# Patient Record
Sex: FEMALE | Race: WHITE | NOT HISPANIC OR LATINO | Employment: OTHER | URBAN - METROPOLITAN AREA
[De-identification: names, ages, dates, MRNs, and addresses within clinical notes are randomized per-mention and may not be internally consistent; named-entity substitution may affect disease eponyms.]

---

## 2017-04-26 ENCOUNTER — TELEPHONE (OUTPATIENT)
Dept: MEDICAL GROUP | Facility: PHYSICIAN GROUP | Age: 72
End: 2017-04-26

## 2017-05-15 ENCOUNTER — PATIENT OUTREACH (OUTPATIENT)
Dept: HEALTH INFORMATION MANAGEMENT | Facility: OTHER | Age: 72
End: 2017-05-15

## 2017-05-15 NOTE — PROGRESS NOTES
5/15/17  -     WebIZ Checked & Epic Updated: No records found  HealthConnect Verified: yes  Verify PCP: yes    Communication Preference Obtained: yes     Review Care Team: yes    Annual Wellness Visit Scheduling  1. Scheduling Status:Scheduled        Care Gap Scheduling (Attempt to Schedule EACH Overdue Care Gap!)     Health Maintenance Due   Topic Date Due   • IMM DTaP/Tdap/Td Vaccine (1 - Tdap) Pt will discuss care gaps with PCP   • IMM ZOSTER VACCINE     • IMM PNEUMOCOCCAL 65+ (ADULT) LOW/MEDIUM RISK SERIES (1 of 2 - PCV13)    • A1C SCREENING     • DIABETES MONOFILAMENT / LE EXAM     • FASTING LIPID PROFILE     • URINE ACR / MICROALBUMIN     • SERUM CREATININE           First Look [employer] Activation: already active  First Look [employer] Prosper: yes  Virtual Visits: yes  Opt In to Text Messages: yes

## 2017-06-05 ENCOUNTER — TELEPHONE (OUTPATIENT)
Dept: MEDICAL GROUP | Facility: PHYSICIAN GROUP | Age: 72
End: 2017-06-05

## 2017-06-05 NOTE — TELEPHONE ENCOUNTER
Future Appointments       Provider Department Center    6/12/2017 9:00 AM [facility]O.; [facility]          Left message for patient to call back regarding pre-visit planning. Please transfer call to [phone].

## 2017-06-08 NOTE — TELEPHONE ENCOUNTER
ANNUAL WELLNESS VISIT PRE-VISIT PLANNING     1.  Reviewed note from last office visit with PCP: YES    2.  If any orders were placed at last visit, do we have Results/Consult Notes?        •  Labs - Labs ordered but needs more so she will wait for new order at appt.       •  Imaging - Imaging was not ordered at last office visit.       •  Referrals - No referrals were ordered at last office visit.    3.  Immunizations were updated in [facility] using WebIZ?: Yes       •  WebIZ Recommendations: PREVNAR (PCV13) , TDAP and ZOSTAVAX (Shingles)       •  Is patient due for Tdap? YES. Patient was notified of copay.       •  Is patient due for Shingles? YES. Patient was notified of copay.     4.  Patient is due for the following Health Maintenance Topics:   Health Maintenance Due   Topic Date Due   • IMM DTaP/Tdap/Td Vaccine (1 - Tdap) 05[birth year]09/1963   • IMM ZOSTER VACCINE  05/09/2004   • IMM PNEUMOCOCCAL 65+ (ADULT) LOW/MEDIUM RISK SERIES (1 of 2 - PCV13) 05/09/2009   • A1C SCREENING  11/13/2016   • DIABETES MONOFILAMENT / LE EXAM  11/24/2016   • FASTING LIPID PROFILE  05/13/2017   • URINE ACR / MICROALBUMIN  05/13/2017   • SERUM CREATININE  05/13/2017   • Annual Wellness Visit  05/19/2017       5.  Reviewed/Updated the following with patient:       •   Preferred Pharmacy? YES       •   Preferred Lab? YES       •   Medications? YES. Was Abstract Encounter opened and chart updated? YES       •   Social History? YES. Was Abstract Encounter opened and chart updated? YES       •   Family History? YES. Was Abstract Encounter opened and chart updated? YES    6.  Care Team Updated:       •   DME Company (gait device, O2, CPAP, etc.): N\A       •   Other Specialists (eye doctor, derm, GYN, cardiology, endo, etc): YES    7.  Patient has the following Care Path diagnoses on Problem List:  DIABETES    Has patient ever had diabetes education? Yes, and is NOT interested in more at this time.      8.  Specialty Comments was updated with diagnosis  information provided by SCP: YES    9.  Patient was advised: “This is a free wellness visit. The provider will screen for medical conditions to help you stay healthy. If you have other concerns to address you may be asked to discuss these at a separate visit or there may be an additional fee.”     6.  Patient was informed to arrive 15 min prior to their scheduled appointment and bring in their medication bottles.

## 2017-06-12 ENCOUNTER — OFFICE VISIT (OUTPATIENT)
Dept: MEDICAL GROUP | Facility: PHYSICIAN GROUP | Age: 72
End: 2017-06-12
Payer: MEDICARE

## 2017-06-12 VITALS
HEART RATE: 88 BPM | BODY MASS INDEX: 21 KG/M2 | HEIGHT: 64 IN | SYSTOLIC BLOOD PRESSURE: 102 MMHG | TEMPERATURE: 97.9 F | OXYGEN SATURATION: 95 % | DIASTOLIC BLOOD PRESSURE: 52 MMHG | WEIGHT: 123 LBS

## 2017-06-12 DIAGNOSIS — E78.00 HYPERCHOLESTEREMIA: ICD-10-CM

## 2017-06-12 DIAGNOSIS — E11.9 TYPE 2 DIABETES MELLITUS WITHOUT COMPLICATION, WITHOUT LONG-TERM CURRENT USE OF INSULIN (HCC): ICD-10-CM

## 2017-06-12 DIAGNOSIS — Z00.00 MEDICARE ANNUAL WELLNESS VISIT, SUBSEQUENT: ICD-10-CM

## 2017-06-12 DIAGNOSIS — M81.0 AGE-RELATED OSTEOPOROSIS WITHOUT CURRENT PATHOLOGICAL FRACTURE: ICD-10-CM

## 2017-06-12 PROCEDURE — G0439 PPPS, SUBSEQ VISIT: HCPCS | Performed by: FAMILY MEDICINE

## 2017-06-12 ASSESSMENT — PAIN SCALES - GENERAL: PAINLEVEL: NO PAIN

## 2017-06-12 ASSESSMENT — PATIENT HEALTH QUESTIONNAIRE - PHQ9: CLINICAL INTERPRETATION OF PHQ2 SCORE: 0

## 2017-06-12 NOTE — MR AVS SNAPSHOT
"        Alejandraradha Martinez   2017 9:20 AM   Office Visit   MRN: [id number]    Department:  [facility]   Dept Phone:  [phone]    Description:  Female : 1945   Provider:  Helena Mccormick D.O.; [facility]            Reason for Visit     Annual Wellness Visit           Allergies as of 2017     No Known Allergies      You were diagnosed with     Medicare annual wellness visit, subsequent   [049213]       Type 2 diabetes mellitus without complication, without long-term current use of insulin (CMS-HCC)   [4881956]       Hypercholesteremia   [730900]       Age-related osteoporosis without current pathological fracture   [484716]         Vital Signs     Blood Pressure Pulse Temperature Height Weight Body Mass Index    102/52 mmHg 88 36.6 °C (97.9 °F) 1.632 m (5' 4.25\") 55.792 kg (123 lb) 20.95 kg/m2    Oxygen Saturation Smoking Status                95% Never Smoker           Basic Information     Date Of Birth Sex Race Ethnicity Preferred Language    1945 Female White Non- English      Problem List              ICD-10-CM Priority Class Noted - Resolved    Hypercholesteremia E78.00   2014 - Present    Type 2 diabetes mellitus without complication (CMS-HCC) E11.9 High  2014 - Present      Health Maintenance        Date Due Completion Dates    IMM DTaP/Tdap/Td Vaccine (1 - Tdap) 1963 ---    IMM ZOSTER VACCINE 2004 ---    IMM PNEUMOCOCCAL 65+ (ADULT) LOW/MEDIUM RISK SERIES (1 of 2 - PCV13) 2009 ---    A1C SCREENING 2016, 2015, 2015, 8/15/2014, 1/3/2014, 10/14/2013, 2013, 2012    DIABETES MONOFILAMENT / LE EXAM 2016    FASTING LIPID PROFILE 2017, 2015, 8/15/2014, 10/14/2013, 2013, 2012, 2012, 2011    URINE ACR / MICROALBUMIN 2017, 2015, 8/15/2014, 1/3/2014, 10/14/2013, 2013, 2012    SERUM CREATININE 2017, 2015, " 9/12/2012, 8/30/2011    RETINAL SCREENING 6/24/2017 6/24/2016, 1/8/2013    MAMMOGRAM 7/19/2017 7/19/2016, 11/6/2014 (Postponed), 2/21/2013, 2/3/2011, 9/9/2009, 9/9/2009, 5/1/2007, 5/1/2007, 5/1/2007    Override on 11/6/2014: Postponed (exam ordered)    BONE DENSITY 11/6/2019 11/6/2014 (Postponed)    Override on 11/6/2014: Postponed (exam ordered)    COLONOSCOPY 11/6/2024 11/6/2014 (Done)    Override on 11/6/2014: Done            Current Immunizations     No immunizations on file.      Below and/or attached are the medications your provider expects you to take. Review all of your home medications and newly ordered medications with your provider and/or pharmacist. Follow medication instructions as directed by your provider and/or pharmacist. Please keep your medication list with you and share with your provider. Update the information when medications are discontinued, doses are changed, or new medications (including over-the-counter products) are added; and carry medication information at all times in the event of emergency situations     Allergies:  No Known Allergies          Medications  Valid as of: June 12, 2017 - 10:04 AM    Generic Name Brand Name Tablet Size Instructions for use    Fluocinonide (Cream) LIDEX 0.05 % Apply bid to affected area as needed        Lisinopril (Tab) PRINIVIL 2.5 MG Take 1 Tab by mouth every day. TAKE 1 TAB BY MOUTH EVERY DAY.        MetFORMIN HCl (Tab) GLUCOPHAGE 500 MG Take 1 Tab by mouth 2 times a day. TAKE 1 TABLET BY MOUTH TWICE A DAY        Pravastatin Sodium (Tab) PRAVACHOL 20 MG Take 1 Tab by mouth every day.        .                 Medicines prescribed today were sent to:     [id number] - [place] - [address]    [address] [place] [address]    Phone: [phone] Fax: [phone]    Open 24 Hours?: No      Medication refill instructions:       If your prescription bottle indicates you have medication refills left, it is not necessary to call your provider’s  office. Please contact your pharmacy and they will refill your medication.    If your prescription bottle indicates you do not have any refills left, you may request refills at any time through one of the following ways: The online [facility] system (except Urgent Care), by calling your provider’s office, or by asking your pharmacy to contact your provider’s office with a refill request. Medication refills are processed only during regular business hours and may not be available until the next business day. Your provider may request additional information or to have a follow-up visit with you prior to refilling your medication.   *Please Note: Medication refills are assigned a new Rx number when refilled electronically. Your pharmacy may indicate that no refills were authorized even though a new prescription for the same medication is available at the pharmacy. Please request the medicine by name with the pharmacy before contacting your provider for a refill.        Your To Do List     Future Labs/Procedures Complete By Expires    CBC WITH DIFFERENTIAL  As directed 6/12/2018    COMP METABOLIC PANEL  As directed 6/12/2018    HEMOGLOBIN A1C  As directed 6/12/2018    LIPID PROFILE  As directed 6/12/2018    MICROALBUMIN CREAT RATIO URINE  As directed 6/12/2018      Other Notes About Your Plan     The 10-year ASCVD risk score (Ro VLADIMIR Jr., et al., 2013) is: 17.3%    Values used to calculate the score:      Age: 71 years      Sex: Female      Is an : No      Diabetic: Yes      Tobacco smoker: No      Systolic Blood Pressure: 118 mmHg      Prescribed Antihypertensives: No      HDL Cholesterol: 71 mg/dL      Total Cholesterol: 156 mg/dL   Goal LDL <100           [employer] Access Code: Activation code not generated  Current [facility] Status: Active

## 2017-06-12 NOTE — PROGRESS NOTES
Chief Complaint   Patient presents with   • Annual Wellness Visit         HPI:  Alejandra Martinez is a 72 y.o. female here for Medicare Annual Wellness Visit        Patient Active Problem List    Diagnosis Date Noted   • Type 2 diabetes mellitus without complication (CMS-[facility]) 11/06/2014     Priority: High   • Hypercholesteremia 11/06/2014       Current Outpatient Prescriptions   Medication Sig Dispense Refill   • pravastatin (PRAVACHOL) 20 MG Tab Take 1 Tab by mouth every day. 90 Tab 4   • lisinopril (PRINIVIL) 2.5 MG Tab Take 1 Tab by mouth every day. TAKE 1 TAB BY MOUTH EVERY DAY. 90 Tab 4   • metformin (GLUCOPHAGE) 500 MG Tab Take 1 Tab by mouth 2 times a day. TAKE 1 TABLET BY MOUTH TWICE A  Tab 4   • fluocinonide (LIDEX) 0.05 % CREA Apply bid to affected area as needed 60 g 1     No current facility-administered medications for this visit.        Patient is taking medications as noted in medication list.  Current supplements as per medication list.   Chronic narcotic pain medicines: no    Allergies: Review of patient's allergies indicates no known allergies.    Current social contact/activities: bowling weekly / visits with family / [employer] functions     Is patient current with immunizations?  No, due for PREVNAR (PCV13) , TDAP and ZOSTAVAX (Shingles). Patient is interested in receiving NONE today.     She  reports that she has never smoked. She has never used smokeless tobacco. She reports that she does not drink alcohol or use illicit drugs.  Counseling given: Yes        DPA/Advanced Directive:  Patient does not have an Advanced Directive.  A packet and workshop information was given on Advanced Directives.    ROS:    Gait: Uses no assistive device   Ostomy: no   Other tubes: no   Amputations: no   Chronic oxygen use: no   Last eye exam: December 2016   Wears hearing aids: no   : Denies incontinence.       Screening:    DIABETES  1. Records requested for overdue HM topics specifically for diabetes?  no    2. Has patient ever had diabetes education? Yes      a. If so, when? When diagnosed      b. If not, is patient interested? no      Depression Screening    Little interest or pleasure in doing things?  0 - not at all  Feeling down, depressed, or hopeless?  0 - not at all  Patient Health Questionnaire Score: 0  If depressive symptoms identified deferred to follow up visit unless specifically addressed in assessment and plan.    Interpretation of PHQ-9 Total Score   Score Severity   1-4 Minimal Depression   5-9 Mild Depression   10-14 Moderate Depression   15-19 Moderately Severe Depression   20-27 Severe Depression    Screening for Cognitive Impairment    Three Minute Recall (banana, sunrise, fence)  3/3    Draws clock face with all 12 numbers and sets the hands to show 10 minutes past 10 o'clock  1 5/5  If cognitive concerns identified deferred to follow up visit unless specifically addressed in assessment and plan.    Fall Risk Assessment    Has the patient had two or more falls in the last year or any fall with injury in the last year?  No  If Fall Risk identified deferred to follow up visit unless specifically addressed in assessment and plan.    Safety Assessment    Throw rugs on floor.  No  Handrails on all stairs.  Yes  Good lighting in all hallways.  Yes  Difficulty hearing.  No  Patient counseled about all safety risks that were identified.    Functional Assessment ADLs    Are there any barriers preventing you from cooking for yourself or meeting nutritional needs?  No.    Are there any barriers preventing you from driving safely or obtaining transportation?  No.    Are there any barriers preventing you from using a telephone or calling for help?  No.    Are there any barriers preventing you from shopping?  No.    Are there any barriers preventing you from taking care of your own finances?  No.    Are there any barriers preventing you from managing your medications?  No.    Are currently engaging any  exercise or physical activity?  Yes.  Tries to walk every day for about 15-30 mins    Health Maintenance Summary                IMM DTaP/Tdap/Td Vaccine Overdue 5/9/1963     IMM ZOSTER VACCINE Overdue 5/9/2004     IMM PNEUMOCOCCAL 65+ (ADULT) LOW/MEDIUM RISK SERIES Overdue 5/9/2009     A1C SCREENING Overdue 11/13/2016      Done 5/13/2016 HEMOGLOBIN A1C (A)     Patient has more history with this topic...    DIABETES MONOFILAMENT / LE EXAM Overdue 11/24/2016      Done 11/24/2015 AMB DIABETIC MONOFILAMENT LOWER EXTREMITY EXAM    FASTING LIPID PROFILE Overdue 5/13/2017      Done 5/13/2016 LIPID PROFILE     Patient has more history with this topic...    URINE ACR / MICROALBUMIN Overdue 5/13/2017      Done 5/13/2016 MICROALBUMIN CREAT RATIO URINE     Patient has more history with this topic...    SERUM CREATININE Overdue 5/13/2017      Done 5/13/2016 COMP METABOLIC PANEL     Patient has more history with this topic...    Annual Wellness Visit Overdue 5/19/2017      Done 5/18/2016 Visit Dx: Medicare annual wellness visit, initial    RETINAL SCREENING Next Due 6/24/2017      Done 6/24/2016 REFERRAL FOR RETINAL SCREENING EXAM     Patient has more history with this topic...    MAMMOGRAM Next Due 7/19/2017      Done 7/19/2016 MA-SCREEN MAMMO W/CAD-BILAT     Patient has more history with this topic...    BONE DENSITY Next Due 11/6/2019      Postponed 11/6/2014 exam ordered    COLONOSCOPY Next Due 11/6/2024      Done 11/6/2014           Patient Care Team:  Helena Mccormick D.O. as PCP - General (Family Medicine)  Rodrigue Enriquez M.D. as Consulting Physician (Ophthalmology)    Social History   Substance Use Topics   • Smoking status: Never Smoker    • Smokeless tobacco: Never Used      Comment: avoid all tobacco products   • Alcohol Use: No      Comment: little to none     Family History   Problem Relation Age of Onset   • Cancer Mother      breast   • Hyperlipidemia Mother    • Cancer Father      bladder   • Genetic Father       "Parkinson's   • Diabetes Neg Hx    • Stroke Neg Hx    • Lung Disease Neg Hx    • Heart Disease Neg Hx    • Hypertension Neg Hx    • Cancer Paternal Aunt      breast   • No Known Problems Sister    • No Known Problems Brother      She  has a past medical history of Hypercholesteremia (11/6/2014) and Type II or unspecified type diabetes mellitus without mention of complication, not stated as uncontrolled (11/6/2014).   Past Surgical History   Procedure Laterality Date   • Appendectomy             Exam:     Blood pressure 102/52, pulse 88, temperature 36.6 °C (97.9 °F), height 1.632 m (5' 4.25\"), weight 55.792 kg (123 lb), SpO2 95 %. Body mass index is 20.95 kg/(m^2).    Hearing excellent.    Dentition good  Alert, oriented in no acute distress.  Eye contact is good, speech goal directed, affect calm  Diabetic Foot Exam:  Proprioception intact. Monofilament testing with a 10 gram force: sensation intact: intact bilaterally throughout the ball of the foot, great toe and heel.  Visual Inspection: Feet without maceration, ulcers, lesion, fissures.  Pedal pulses: intact bilaterally      Assessment and Plan. The following treatment and monitoring plan is recommended:    1. Medicare annual wellness visit, subsequent      2. Type 2 diabetes mellitus without complication, without long-term current use of insulin (CMS-[facility])  Well controlled in the past.  Will recheck labs.  Discussed diet, exercise, and disease management.  Pt also understands how to count carbs with a goal for 50 carbs per meal. The pt was advised to set a goal for each meal to be 25%carbs, 25% protein, and 50% fruits/veggies. Pt also advised to look at ADA website for further information.  Pt is currently on Metformin, ASA, ACEi, and statin.   - MICROALBUMIN CREAT RATIO URINE; Future  - HEMOGLOBIN A1C; Future  - LIPID PROFILE; Future  - COMP METABOLIC PANEL; Future  - CBC WITH DIFFERENTIAL; Future  - Diabetic Monofilament Lower Extremity Exam    3. " Hypercholesteremia  Continue Pravastatin    4. Age-related osteoporosis without current pathological fracture  From lifeline screening in 2016. Recommend Vit D and Calcium.       Services suggested: No services needed at this time  Health Care Screening recommendations as per orders if indicated.  Referrals offered: PT/OT/Nutrition counseling/Behavioral Health/Smoking cessation as per orders if indicated.    Discussion today about general wellness and lifestyle habits:    · Prevent falls and reduce trip hazards; Cautioned about securing or removing rugs.  · Have a working fire alarm and carbon monoxide detector;   · Engage in regular physical activity and social activities       Follow-up: Return in about 6 months (around 12/12/2017) for F/U DM.

## 2017-06-14 NOTE — TELEPHONE ENCOUNTER
Was the patient seen in the last year in this department? Yes     Does patient have an active prescription for medications requested? No     Received Request Via: Pharmacy      Pt met protocol?: Yes    A1C 5/16

## 2017-06-15 NOTE — TELEPHONE ENCOUNTER
Patient has recently been seen by PCP within the last 6 months per protocol (5/17). Will refill medications for 3 months as pt still needs labs.  Lab Results   Component Value Date/Time    GLYCOHEMOGLOBIN 6.1* 05/13/2016 06:36 AM      Lab Results   Component Value Date/Time    MICRO ALB CREAT RATIO see below 05/13/2016 06:36 AM    MICROALBUMIN, URINE RANDOM <0.7 05/13/2016 06:36 AM      Lab Results   Component Value Date/Time    ALKALINE PHOSPHATASE 60 05/13/2016 06:36 AM    AST(SGOT) 13 05/13/2016 06:36 AM    ALT(SGPT) 13 05/13/2016 06:36 AM    TOTAL BILIRUBIN 0.5 05/13/2016 06:36 AM

## 2017-07-14 RX ORDER — LISINOPRIL 2.5 MG/1
TABLET ORAL
Qty: 90 TAB | Refills: 2 | Status: SHIPPED | OUTPATIENT
Start: 2017-07-14 | End: 2018-04-02 | Stop reason: SDUPTHER

## 2017-07-27 ENCOUNTER — HOSPITAL ENCOUNTER (OUTPATIENT)
Dept: LAB | Facility: MEDICAL CENTER | Age: 72
End: 2017-07-27
Attending: FAMILY MEDICINE
Payer: MEDICARE

## 2017-07-27 DIAGNOSIS — E11.9 TYPE 2 DIABETES MELLITUS WITHOUT COMPLICATION, WITHOUT LONG-TERM CURRENT USE OF INSULIN (HCC): ICD-10-CM

## 2017-07-27 LAB
ALBUMIN SERPL BCP-MCNC: 4 G/DL (ref 3.2–4.9)
ALBUMIN/GLOB SERPL: 1.6 G/DL
ALP SERPL-CCNC: 57 U/L (ref 30–99)
ALT SERPL-CCNC: 11 U/L (ref 2–50)
ANION GAP SERPL CALC-SCNC: 4 MMOL/L (ref 0–11.9)
AST SERPL-CCNC: 15 U/L (ref 12–45)
BASOPHILS # BLD AUTO: 0.9 % (ref 0–1.8)
BASOPHILS # BLD: 0.06 K/UL (ref 0–0.12)
BILIRUB SERPL-MCNC: 0.5 MG/DL (ref 0.1–1.5)
BUN SERPL-MCNC: 13 MG/DL (ref 8–22)
CALCIUM SERPL-MCNC: 8.8 MG/DL (ref 8.5–10.5)
CHLORIDE SERPL-SCNC: 104 MMOL/L (ref 96–112)
CHOLEST SERPL-MCNC: 144 MG/DL (ref 100–199)
CO2 SERPL-SCNC: 30 MMOL/L (ref 20–33)
CREAT SERPL-MCNC: 0.69 MG/DL (ref 0.5–1.4)
CREAT UR-MCNC: 45 MG/DL
EOSINOPHIL # BLD AUTO: 0.19 K/UL (ref 0–0.51)
EOSINOPHIL NFR BLD: 3 % (ref 0–6.9)
ERYTHROCYTE [DISTWIDTH] IN BLOOD BY AUTOMATED COUNT: 45.6 FL (ref 35.9–50)
EST. AVERAGE GLUCOSE BLD GHB EST-MCNC: 128 MG/DL
GFR SERPL CREATININE-BSD FRML MDRD: >60 ML/MIN/1.73 M 2
GLOBULIN SER CALC-MCNC: 2.5 G/DL (ref 1.9–3.5)
GLUCOSE SERPL-MCNC: 87 MG/DL (ref 65–99)
HBA1C MFR BLD: 6.1 % (ref 0–5.6)
HCT VFR BLD AUTO: 44.2 % (ref 37–47)
HDLC SERPL-MCNC: 69 MG/DL
HGB BLD-MCNC: 14.3 G/DL (ref 12–16)
IMM GRANULOCYTES # BLD AUTO: 0.01 K/UL (ref 0–0.11)
IMM GRANULOCYTES NFR BLD AUTO: 0.2 % (ref 0–0.9)
LDLC SERPL CALC-MCNC: 60 MG/DL
LYMPHOCYTES # BLD AUTO: 2.11 K/UL (ref 1–4.8)
LYMPHOCYTES NFR BLD: 33.1 % (ref 22–41)
MCH RBC QN AUTO: 29.8 PG (ref 27–33)
MCHC RBC AUTO-ENTMCNC: 32.4 G/DL (ref 33.6–35)
MCV RBC AUTO: 92.1 FL (ref 81.4–97.8)
MICROALBUMIN UR-MCNC: <0.7 MG/DL
MICROALBUMIN/CREAT UR: NORMAL MG/G (ref 0–30)
MONOCYTES # BLD AUTO: 0.67 K/UL (ref 0–0.85)
MONOCYTES NFR BLD AUTO: 10.5 % (ref 0–13.4)
NEUTROPHILS # BLD AUTO: 3.33 K/UL (ref 2–7.15)
NEUTROPHILS NFR BLD: 52.3 % (ref 44–72)
NRBC # BLD AUTO: 0 K/UL
NRBC BLD AUTO-RTO: 0 /100 WBC
PLATELET # BLD AUTO: 368 K/UL (ref 164–446)
PMV BLD AUTO: 9.8 FL (ref 9–12.9)
POTASSIUM SERPL-SCNC: 3.9 MMOL/L (ref 3.6–5.5)
PROT SERPL-MCNC: 6.5 G/DL (ref 6–8.2)
RBC # BLD AUTO: 4.8 M/UL (ref 4.2–5.4)
SODIUM SERPL-SCNC: 138 MMOL/L (ref 135–145)
TRIGL SERPL-MCNC: 76 MG/DL (ref 0–149)
WBC # BLD AUTO: 6.4 K/UL (ref 4.8–10.8)

## 2017-07-27 PROCEDURE — 80053 COMPREHEN METABOLIC PANEL: CPT

## 2017-07-27 PROCEDURE — 80061 LIPID PANEL: CPT

## 2017-07-27 PROCEDURE — 85025 COMPLETE CBC W/AUTO DIFF WBC: CPT

## 2017-07-27 PROCEDURE — 36415 COLL VENOUS BLD VENIPUNCTURE: CPT

## 2017-07-27 PROCEDURE — 82043 UR ALBUMIN QUANTITATIVE: CPT

## 2017-07-27 PROCEDURE — 82570 ASSAY OF URINE CREATININE: CPT

## 2017-07-27 PROCEDURE — 83036 HEMOGLOBIN GLYCOSYLATED A1C: CPT

## 2017-11-03 DIAGNOSIS — E78.00 HYPERCHOLESTEREMIA: ICD-10-CM

## 2017-11-03 RX ORDER — PRAVASTATIN SODIUM 20 MG
20 TABLET ORAL DAILY
Qty: 90 TAB | Refills: 4 | Status: SHIPPED | OUTPATIENT
Start: 2017-11-03 | End: 2019-02-22 | Stop reason: SDUPTHER

## 2017-12-27 NOTE — TELEPHONE ENCOUNTER
Was the patient seen in the last year in this department? Yes     Does patient have an active prescription for medications requested? No     Received Request Via: Patient      Pt met protocol?: Yes, OV 6/17   Lab Results   Component Value Date/Time    HBA1C 6.1 (H) 07/27/2017 06:28 AM

## 2018-01-15 ENCOUNTER — OFFICE VISIT (OUTPATIENT)
Dept: MEDICAL GROUP | Facility: PHYSICIAN GROUP | Age: 73
End: 2018-01-15
Payer: MEDICARE

## 2018-01-15 VITALS
HEIGHT: 64 IN | SYSTOLIC BLOOD PRESSURE: 122 MMHG | BODY MASS INDEX: 21.17 KG/M2 | DIASTOLIC BLOOD PRESSURE: 60 MMHG | WEIGHT: 124 LBS | HEART RATE: 93 BPM | TEMPERATURE: 98.5 F | OXYGEN SATURATION: 95 %

## 2018-01-15 DIAGNOSIS — L60.9 NAIL ABNORMALITIES: ICD-10-CM

## 2018-01-15 DIAGNOSIS — K92.1 HEMATOCHEZIA: ICD-10-CM

## 2018-01-15 DIAGNOSIS — Z12.31 ENCOUNTER FOR SCREENING MAMMOGRAM FOR MALIGNANT NEOPLASM OF BREAST: ICD-10-CM

## 2018-01-15 DIAGNOSIS — E11.9 TYPE 2 DIABETES MELLITUS WITHOUT COMPLICATION, WITHOUT LONG-TERM CURRENT USE OF INSULIN (HCC): ICD-10-CM

## 2018-01-15 DIAGNOSIS — N81.10 BLADDER PROLAPSE, FEMALE, ACQUIRED: ICD-10-CM

## 2018-01-15 PROCEDURE — 99214 OFFICE O/P EST MOD 30 MIN: CPT | Performed by: FAMILY MEDICINE

## 2018-01-16 ENCOUNTER — HOSPITAL ENCOUNTER (OUTPATIENT)
Facility: MEDICAL CENTER | Age: 73
End: 2018-01-16
Attending: FAMILY MEDICINE
Payer: MEDICARE

## 2018-01-16 PROCEDURE — 82274 ASSAY TEST FOR BLOOD FECAL: CPT

## 2018-01-16 NOTE — PROGRESS NOTES
Subjective:     Chief Complaint   Patient presents with   • Other     blood in stool on Dec 9th after getting sick, no longer occuring       Alejandra Martinez is a 72 y.o. female here today for blood in stool.  Pt reports she was at friend's house on Dec 9th.  Pt had diarrhea and vomiting for several hours.  Pt then had blood clot pass followed by a few small clots. Pt had fatigue as well.  Since Dec 10th pt has had no GI issues or fatigue. No blood in stool or dark stool.     Diabetes:  Patient is compliant with medications. No side effects reported such as diarrhea, abdominal pain, dark tarry stool, hematochezia, headache, shakiness, or lightheadedness due to low blood sugar.  Patient denies chest pain, numbness and tingling in hands and feet, change in sensation in hands or feet, sores on feet, change in urine,  or change in vision.      Pt reports bladder prolapse.  Pt is interested in non-surgical options.      No Known Allergies  Current medicines (including changes today)  Current Outpatient Prescriptions   Medication Sig Dispense Refill   • metformin (GLUCOPHAGE) 500 MG Tab TAKE 1 TABLET BY MOUTH TWICE A  Tab 0   • pravastatin (PRAVACHOL) 20 MG Tab Take 1 Tab by mouth every day. 90 Tab 4   • lisinopril (PRINIVIL) 2.5 MG Tab TAKE 1 TABLET BY MOUTH ONCE DAILY 90 Tab 2   • fluocinonide (LIDEX) 0.05 % CREA Apply bid to affected area as needed 60 g 1     No current facility-administered medications for this visit.      Social History   Substance Use Topics   • Smoking status: Never Smoker   • Smokeless tobacco: Never Used      Comment: avoid all tobacco products   • Alcohol use No      Comment: little to none     Family Status   Relation Status   • Mother  at age 80's   • Father  at age 80's   • Sister Alive   • Brother Alive   • Paternal Aunt    • Neg Hx      Family History   Problem Relation Age of Onset   • Cancer Mother      breast   • Hyperlipidemia Mother    • Cancer Father      bladder  "  • Genetic Father      Parkinson's   • No Known Problems Sister    • No Known Problems Brother    • Cancer Paternal Aunt      breast   • Diabetes Neg Hx    • Stroke Neg Hx    • Lung Disease Neg Hx    • Heart Disease Neg Hx    • Hypertension Neg Hx      She    has a past medical history of Hypercholesteremia (11/6/2014) and Type II or unspecified type diabetes mellitus without mention of complication, not stated as uncontrolled (11/6/2014).        ROS   GEN: no weight loss, fevers, or chills  Resp: no shortness of breath, no cough  CV: no racing heart, no irregular beats, no chest pain  Abd: no nausea, no vomiting, no diarrhea, no constipation, no blood in stool, no dark stools, no incontinence  : no dysuria, no hematuria, no urinary incontinence, no increased frequency  MSK: no muscle aches, no joint pain, no limited motion  Neuro: no headaches, no dizziness, no LOC, no weakness, no numbness/tingling       Objective:     Blood pressure 122/60, pulse 93, temperature 36.9 °C (98.5 °F), height 1.632 m (5' 4.25\"), weight 56.2 kg (124 lb), SpO2 95 %. Body mass index is 21.12 kg/m².   Physical Exam:  Constitutional: Alert, no distress.  Skin: Warm, dry, good turgor, ridges and brittle nails  Eye: Equal, round and reactive, conjunctiva clear, lids normal.  ENMT: Lips without lesions, oropharynx non-erythematous, no exudate, moist oral mucosa  Neck: Trachea midline, no masses, no thyromegaly. No cervical or supraclavicular lymphadenopathy. Full ROM  Respiratory: Unlabored respiratory effort, good air movement, lungs clear to auscultation, no wheezes, no ronchi.  Cardiovascular:RRR, +S1, S2, no murmur, no peripheral edema, pedal and radial pulses equal and intact bilat  Abdomen: Soft, non-tender, no masses, no hepatosplenomegaly, no rebound tenderness, no gaurding, no suprapubic tenderness.  MSK:5/5 muscle strength in upper extremities as well as lower extremity bilaterally  Psych: Alert and oriented, appropriate affect " and mood.  Neuro: CN2-12 intact, no gross motor or sensory deficits      Assessment and Plan:   The following treatment plan was discussed    1. Hematochezia  New complaint: now resolved.  Will obtain FIT and CBC.  Likey due to acute illnees  - CBC WITH DIFFERENTIAL; Futurel  - COMP METABOLIC PANEL; Future  - OCCULT BLOOD FECES IMMUNOASSAY; Future    2. Type 2 diabetes mellitus without complication, without long-term current use of insulin (CMS-HCC)  A1C 6.1 in July. Chronic: well controlled  - HEMOGLOBIN A1C; Future    3. Bladder prolapse, female, acquired  Chronic: worsening.  Recommend PT and pessary.   - REFERRAL TO GYNECOLOGY  - REFERRAL TO PHYSICAL THERAPY Reason for Therapy: Eval/Treat/Report    4. Encounter for screening mammogram for malignant neoplasm of breast  - MA-SCREEN MAMMO W/CAD-BILAT; Future    5. Nail abnormalities  Brittle, recommend TSH and vitamin testing.   - VITAMIN D,25 HYDROXY; Future  - TSH WITH REFLEX TO FT4; Future    Total 25minutes face-to-face time spent with patient, with greater than 50% of the total time discussing patient's issues and symptoms as listed above in assessment and plan, as well as managing coordination of care for future evaluation and treatment.  Followup: Return in about 4 weeks (around 2/12/2018), or if symptoms worsen or fail to improve, for with Emily Rowan.    Please note that this dictation was created using voice recognition software. I have made every reasonable attempt to correct obvious errors, but I expect that there are errors of grammar and possibly content that I did not discover before finalizing the note.

## 2018-01-20 ENCOUNTER — HOSPITAL ENCOUNTER (OUTPATIENT)
Dept: RADIOLOGY | Facility: MEDICAL CENTER | Age: 73
End: 2018-01-20
Attending: FAMILY MEDICINE
Payer: MEDICARE

## 2018-01-20 DIAGNOSIS — Z12.31 ENCOUNTER FOR SCREENING MAMMOGRAM FOR MALIGNANT NEOPLASM OF BREAST: ICD-10-CM

## 2018-01-20 PROCEDURE — 77067 SCR MAMMO BI INCL CAD: CPT

## 2018-01-21 DIAGNOSIS — K92.1 HEMATOCHEZIA: ICD-10-CM

## 2018-01-22 LAB — HEMOCCULT STL QL IA: NEGATIVE

## 2018-01-25 NOTE — PROGRESS NOTES
Good Morning,  Dr Mccormick is not available today.   This shows that your Fit test a test for blood in the stool was negative. If you still see blood or have symptoms you should return to the clinic  Thank you   Regla Slater RN, APN

## 2018-04-02 ENCOUNTER — OFFICE VISIT (OUTPATIENT)
Dept: MEDICAL GROUP | Facility: MEDICAL CENTER | Age: 73
End: 2018-04-02
Payer: MEDICARE

## 2018-04-02 VITALS
DIASTOLIC BLOOD PRESSURE: 60 MMHG | SYSTOLIC BLOOD PRESSURE: 120 MMHG | RESPIRATION RATE: 16 BRPM | HEIGHT: 64 IN | WEIGHT: 123 LBS | HEART RATE: 83 BPM | OXYGEN SATURATION: 96 % | TEMPERATURE: 97.5 F | BODY MASS INDEX: 21 KG/M2

## 2018-04-02 DIAGNOSIS — E78.00 HYPERCHOLESTEREMIA: ICD-10-CM

## 2018-04-02 DIAGNOSIS — E11.9 TYPE 2 DIABETES MELLITUS WITHOUT COMPLICATION, WITHOUT LONG-TERM CURRENT USE OF INSULIN (HCC): ICD-10-CM

## 2018-04-02 PROCEDURE — 99213 OFFICE O/P EST LOW 20 MIN: CPT | Performed by: INTERNAL MEDICINE

## 2018-04-02 RX ORDER — LISINOPRIL 2.5 MG/1
2.5 TABLET ORAL DAILY
Qty: 90 TAB | Refills: 3 | Status: SHIPPED | OUTPATIENT
Start: 2018-04-02 | End: 2018-09-05

## 2018-04-02 NOTE — ASSESSMENT & PLAN NOTE
She has hyperlipidemia for which she takes pravastatin. She tries to follow appropriate diet. Most recent lipid panel was last July when cholesterol was 144, triglycerides 76, HDL 69, LDL 60.

## 2018-04-02 NOTE — ASSESSMENT & PLAN NOTE
This patient has diabetes mellitus that is well controlled with metformin 500 mg twice a day. She has not had any recent labs but in July her glucose was 87 and glycohemoglobin 6.1. She denies low blood sugar reactions. She needs a refill on the metformin.

## 2018-04-02 NOTE — PROGRESS NOTES
Subjective:     Chief Complaint   Patient presents with   • Medication Refill     Pravastatin     Alejandra Martinez is a 72 y.o. female here today for follow-up of her diabetes mellitus as well as hyperlipidemia and for medication refills.    Type 2 diabetes mellitus without complication (CMS-HCC)  This patient has diabetes mellitus that is well controlled with metformin 500 mg twice a day. She has not had any recent labs but in July her glucose was 87 and glycohemoglobin 6.1. She denies low blood sugar reactions. She needs a refill on the metformin.    Hypercholesteremia  She has hyperlipidemia for which she takes pravastatin. She tries to follow appropriate diet. Most recent lipid panel was last July when cholesterol was 144, triglycerides 76, HDL 69, LDL 60.       Diagnoses of Hypercholesteremia and Type 2 diabetes mellitus without complication, without long-term current use of insulin (CMS-HCC) were pertinent to this visit.    Allergies: Patient has no known allergies.  Current medicines (including changes today)  Current Outpatient Prescriptions   Medication Sig Dispense Refill   • lisinopril (PRINIVIL) 2.5 MG Tab Take 1 Tab by mouth every day. 90 Tab 3   • metFORMIN (GLUCOPHAGE) 500 MG Tab Take 1 Tab by mouth 2 times a day. TAKE 1 TABLET BY MOUTH TWICE A  Tab 3   • pravastatin (PRAVACHOL) 20 MG Tab Take 1 Tab by mouth every day. 90 Tab 4   • fluocinonide (LIDEX) 0.05 % CREA Apply bid to affected area as needed 60 g 1     No current facility-administered medications for this visit.        She  has a past medical history of Hypercholesteremia (11/6/2014) and Type II or unspecified type diabetes mellitus without mention of complication, not stated as uncontrolled (11/6/2014).    ROS    Patient denies significant change in strength, weight or appetite.  No significant lightheadedness or headaches.  No change in vision, hearing, or swallowing.  No new dyspnea, coughing, chest pain, or palpitations.  No  "indigestion, abdominal pain, or change in bowel habits.  No change in urinating.  No new ankle swelling.       Objective:     PE:  /60   Pulse 83   Temp 36.4 °C (97.5 °F)   Resp 16   Ht 1.626 m (5' 4\")   Wt 55.8 kg (123 lb)   SpO2 96%   BMI 21.11 kg/m²    Neck is supple without significant lymphadenopathy or masses.  Lungs are clear with normal breath sounds without wheezes or rales .  Cardiovascular: peripheral circulation is satisfactory, heart sounds are unchanged and unremarkable.  Abdomen is soft, without masses or tenderness, with normal bowel sounds.  Extremities are without significant edema, cyanosis or deformity.      Assessment and Plan:   The following treatment plan was discussed  1. Hypercholesteremia      She will continue pravastatin. We briefly reviewed appropriate diet.   2. Type 2 diabetes mellitus without complication, without long-term current use of insulin (CMS-[facility])      Continue metformin. Discussed again appropriate diet. She needs more recent labs. Questions about lisinopril were answered to her satisfaction.       Followup: She will follow-up in the near future with her new primary care supplier.           "

## 2018-04-03 ENCOUNTER — HOSPITAL ENCOUNTER (OUTPATIENT)
Dept: LAB | Facility: MEDICAL CENTER | Age: 73
End: 2018-04-03
Attending: FAMILY MEDICINE
Payer: MEDICARE

## 2018-04-03 DIAGNOSIS — L60.9 NAIL ABNORMALITIES: ICD-10-CM

## 2018-04-03 DIAGNOSIS — E11.9 TYPE 2 DIABETES MELLITUS WITHOUT COMPLICATION, WITHOUT LONG-TERM CURRENT USE OF INSULIN (HCC): ICD-10-CM

## 2018-04-03 DIAGNOSIS — K92.1 HEMATOCHEZIA: ICD-10-CM

## 2018-04-03 LAB
25(OH)D3 SERPL-MCNC: 32 NG/ML (ref 30–100)
ALBUMIN SERPL BCP-MCNC: 3.9 G/DL (ref 3.2–4.9)
ALBUMIN/GLOB SERPL: 1.7 G/DL
ALP SERPL-CCNC: 50 U/L (ref 30–99)
ALT SERPL-CCNC: 11 U/L (ref 2–50)
ANION GAP SERPL CALC-SCNC: 5 MMOL/L (ref 0–11.9)
AST SERPL-CCNC: 14 U/L (ref 12–45)
BASOPHILS # BLD AUTO: 1.2 % (ref 0–1.8)
BASOPHILS # BLD: 0.07 K/UL (ref 0–0.12)
BILIRUB SERPL-MCNC: 0.4 MG/DL (ref 0.1–1.5)
BUN SERPL-MCNC: 17 MG/DL (ref 8–22)
CALCIUM SERPL-MCNC: 9.4 MG/DL (ref 8.5–10.5)
CHLORIDE SERPL-SCNC: 105 MMOL/L (ref 96–112)
CO2 SERPL-SCNC: 29 MMOL/L (ref 20–33)
CREAT SERPL-MCNC: 0.75 MG/DL (ref 0.5–1.4)
EOSINOPHIL # BLD AUTO: 0.21 K/UL (ref 0–0.51)
EOSINOPHIL NFR BLD: 3.6 % (ref 0–6.9)
ERYTHROCYTE [DISTWIDTH] IN BLOOD BY AUTOMATED COUNT: 48.4 FL (ref 35.9–50)
EST. AVERAGE GLUCOSE BLD GHB EST-MCNC: 131 MG/DL
GLOBULIN SER CALC-MCNC: 2.3 G/DL (ref 1.9–3.5)
GLUCOSE SERPL-MCNC: 92 MG/DL (ref 65–99)
HBA1C MFR BLD: 6.2 % (ref 0–5.6)
HCT VFR BLD AUTO: 43.1 % (ref 37–47)
HGB BLD-MCNC: 13.7 G/DL (ref 12–16)
IMM GRANULOCYTES # BLD AUTO: 0.01 K/UL (ref 0–0.11)
IMM GRANULOCYTES NFR BLD AUTO: 0.2 % (ref 0–0.9)
LYMPHOCYTES # BLD AUTO: 2 K/UL (ref 1–4.8)
LYMPHOCYTES NFR BLD: 33.9 % (ref 22–41)
MCH RBC QN AUTO: 29.7 PG (ref 27–33)
MCHC RBC AUTO-ENTMCNC: 31.8 G/DL (ref 33.6–35)
MCV RBC AUTO: 93.3 FL (ref 81.4–97.8)
MONOCYTES # BLD AUTO: 0.63 K/UL (ref 0–0.85)
MONOCYTES NFR BLD AUTO: 10.7 % (ref 0–13.4)
NEUTROPHILS # BLD AUTO: 2.98 K/UL (ref 2–7.15)
NEUTROPHILS NFR BLD: 50.4 % (ref 44–72)
NRBC # BLD AUTO: 0 K/UL
NRBC BLD-RTO: 0 /100 WBC
PLATELET # BLD AUTO: 399 K/UL (ref 164–446)
PMV BLD AUTO: 9.7 FL (ref 9–12.9)
POTASSIUM SERPL-SCNC: 4.1 MMOL/L (ref 3.6–5.5)
PROT SERPL-MCNC: 6.2 G/DL (ref 6–8.2)
RBC # BLD AUTO: 4.62 M/UL (ref 4.2–5.4)
SODIUM SERPL-SCNC: 139 MMOL/L (ref 135–145)
TSH SERPL DL<=0.005 MIU/L-ACNC: 2.15 UIU/ML (ref 0.38–5.33)
WBC # BLD AUTO: 5.9 K/UL (ref 4.8–10.8)

## 2018-04-03 PROCEDURE — 85025 COMPLETE CBC W/AUTO DIFF WBC: CPT

## 2018-04-03 PROCEDURE — 80053 COMPREHEN METABOLIC PANEL: CPT

## 2018-04-03 PROCEDURE — 82306 VITAMIN D 25 HYDROXY: CPT

## 2018-04-03 PROCEDURE — 83036 HEMOGLOBIN GLYCOSYLATED A1C: CPT

## 2018-04-03 PROCEDURE — 84443 ASSAY THYROID STIM HORMONE: CPT

## 2018-04-03 PROCEDURE — 36415 COLL VENOUS BLD VENIPUNCTURE: CPT

## 2018-04-06 ENCOUNTER — TELEPHONE (OUTPATIENT)
Dept: MEDICAL GROUP | Facility: PHYSICIAN GROUP | Age: 73
End: 2018-04-06

## 2018-04-06 NOTE — TELEPHONE ENCOUNTER
----- Message from Shirley Edge M.D. sent at 4/5/2018 11:18 PM PDT -----  All of your labs were normal. You may see some that are highlighted, however these have no clinical significance. ( Given your age I am OK with your A1C <7 )   Shirley Edge M.D.

## 2018-04-13 ENCOUNTER — TELEPHONE (OUTPATIENT)
Dept: MEDICAL GROUP | Facility: PHYSICIAN GROUP | Age: 73
End: 2018-04-13

## 2018-04-13 NOTE — LETTER
April 13, 2018        Alejandra Martinez  [address] [address]        Dear Alejandra:    Caleb Roberts, below are your results from Dr Edge     All of your labs were normal. You may see some that are highlighted, however these have no clinical significance. ( Given your age I am OK with your A1C <7 )   Shirley Edge M.D.      If you have any questions or concerns, please don't hesitate to call.        Sincerely,  SHO Nance for Dr. Edge      Electronically Signed

## 2018-04-13 NOTE — TELEPHONE ENCOUNTER
----- Message from Alejandra Martinez sent at 4/13/2018  5:00 AM PDT -----  Regarding: Results  Hi Alejandra, below are your results from Dr Edge    All of your labs were normal. You may see some that are highlighted, however these have no clinical significance. ( Given your age I am OK with your A1C <7 )   Shirley Edge M.D.

## 2018-05-23 ENCOUNTER — TELEPHONE (OUTPATIENT)
Dept: MEDICAL GROUP | Facility: PHYSICIAN GROUP | Age: 73
End: 2018-05-23

## 2018-05-23 NOTE — TELEPHONE ENCOUNTER
Future Appointments       Provider Department Center    5/24/2018 10:25 AM Emily Rowan P.A.-C. [facility]        ESTABLISHED PATIENT PRE-VISIT PLANNING     Note: Patient will not be contacted if there is no indication to call.     1.  Reviewed notes from the last few office visits within the medical group: Yes    2.  If any orders were placed at last visit or intended to be done for this visit (i.e. 6 mos follow-up), do we have Results/Consult Notes?        •  Labs - Labs ordered, completed on 04/03/18 and results are in chart.       •  Imaging - Imaging was not ordered at last office visit.       •  Referrals - No referrals were ordered at last office visit.    3. Is this appointment scheduled as a Hospital Follow-Up? No    4.  Immunizations were updated in Epic using WebIZ?: No WebIZ record       •  Web Iz Recommendations: PREVNAR (PCV13)  and TDAP    5.  Patient is due for the following Health Maintenance Topics:   Health Maintenance Due   Topic Date Due   • IMM DTaP/Tdap/Td Vaccine (1 - Tdap) 05[birth year]09/1964   • IMM PNEUMOCOCCAL (1 of 2 - PCV13) 05/09/2009   • RETINAL SCREENING  06/24/2017       6.  MDX printed for Provider? NO completed on 04/02/2018    7.  Patient was informed to arrive 15 min prior to their scheduled appointment and bring in their medication bottles. Confirmed through automated call

## 2018-05-24 ENCOUNTER — OFFICE VISIT (OUTPATIENT)
Dept: MEDICAL GROUP | Facility: PHYSICIAN GROUP | Age: 73
End: 2018-05-24
Payer: MEDICARE

## 2018-05-24 VITALS
SYSTOLIC BLOOD PRESSURE: 100 MMHG | HEART RATE: 95 BPM | TEMPERATURE: 99.3 F | DIASTOLIC BLOOD PRESSURE: 50 MMHG | BODY MASS INDEX: 21 KG/M2 | OXYGEN SATURATION: 95 % | WEIGHT: 123 LBS | HEIGHT: 64 IN

## 2018-05-24 DIAGNOSIS — E78.00 HYPERCHOLESTEREMIA: ICD-10-CM

## 2018-05-24 DIAGNOSIS — L30.9 ECZEMA OF BOTH HANDS: ICD-10-CM

## 2018-05-24 DIAGNOSIS — E11.9 TYPE 2 DIABETES MELLITUS WITHOUT COMPLICATION, WITHOUT LONG-TERM CURRENT USE OF INSULIN (HCC): ICD-10-CM

## 2018-05-24 PROCEDURE — 99214 OFFICE O/P EST MOD 30 MIN: CPT | Performed by: PHYSICIAN ASSISTANT

## 2018-05-24 ASSESSMENT — PATIENT HEALTH QUESTIONNAIRE - PHQ9: CLINICAL INTERPRETATION OF PHQ2 SCORE: 0

## 2018-05-24 NOTE — PROGRESS NOTES
Subjective:   Alejandra Martinez is a 73 y.o. female here today for follow-up on diabetes, high cholesterol, and eczema. Is a new patient to me and is also establishing care today.    Previous PCP: Helena Mccormick DO    HPI: Patient has the following current medical problems:    Type 2 diabetes mellitus without complication (HCC)  Diagnosed 7-8 years ago per patient. Currently treated with Meformin 500 mg BID which she is compliant with and tolerating well. Tries to follow diabetic diet as much as possible. Likes to walk, hasn't been quite as physically active as she'd like lately. Does not currently check fasting BS at home. Labs last month and would like to discuss results.    Hypercholesteremia  Chronic issue, has been on pravastatin for many years. Compliant with medication. Last lipid panel in July 2017.    Eczema of both hands  Gets flare-ups of eczema on palms of bilateral hands every few months. Uses fluocinonide cream as needed, which helps.       Current medicines (including changes today)  Current Outpatient Prescriptions   Medication Sig Dispense Refill   • lisinopril (PRINIVIL) 2.5 MG Tab Take 1 Tab by mouth every day. 90 Tab 3   • metFORMIN (GLUCOPHAGE) 500 MG Tab Take 1 Tab by mouth 2 times a day. TAKE 1 TABLET BY MOUTH TWICE A  Tab 3   • pravastatin (PRAVACHOL) 20 MG Tab Take 1 Tab by mouth every day. 90 Tab 4   • fluocinonide (LIDEX) 0.05 % CREA Apply bid to affected area as needed 60 g 1     No current facility-administered medications for this visit.      She  has a past medical history of Hypercholesteremia (11/6/2014) and Type II or unspecified type diabetes mellitus without mention of complication, not stated as uncontrolled (11/6/2014).    ROS  Constitutional ROS: Positive for fatigue   Gastrointestinal ROS: No diarrhea  Skin/Integumentary ROS: Positive for change in nails (thinner, more brittle) - states is reason previous PCP checked her thyroid       Objective:     Blood pressure  "100/50, pulse 95, temperature 37.4 °C (99.3 °F), height 1.626 m (5' 4\"), weight 55.8 kg (123 lb), SpO2 95 %. Body mass index is 21.11 kg/m².     Physical Exam:  Constitutional: Alert, well-appearing, very pleasant, no distress.  Skin: Warm, dry, good turgor. Scaly erythematous rash on bilateral palms of hands.  Eye: Conjunctiva clear, lids normal.  ENMT: Lips without lesions, moist mucus membranes.  Neck: No masses. No submandibular or cervical lymphadenopathy. No palpable thyromegaly.  Respiratory: Unlabored respiratory effort, lungs clear to auscultation, no wheezes, no rhonchi.  Cardiovascular: Normal S1, S2, no murmur, no lower extremity edema.      Assessment and Plan:   The following treatment plan was discussed    1. Type 2 diabetes mellitus without complication, without long-term current use of insulin (HCC)  Chronic issue, well controlled on twice a day metformin. Reviewed recent labs. Fasting blood sugar normal with A1c of 6.2 which I explained indicate excellent control of her diabetes. Continue current management. Encouraged to continue diabetic diet and plenty of physical activity.    2. Hypercholesteremia  Chronic issue, well controlled on daily pravastatin. Reviewed most recent lipid panel from 7/2017 which was normal with LDL at goal of <70. Continue current management.    Cholesterol,Tot 144  100 - 199 mg/dL Final   Triglycerides 76  0 - 149 mg/dL Final   HDL 69  >=40 mg/dL Final   LDL 60  <100 mg/dL Final       3. Eczema of both hands  Chronic issue, well controlled with PRN steroid cream. Continue current management.    Discussed all other recent lab results with patient. They all came back normal.    Followup: Return in about 6 months (around 11/24/2018) for routine f/u; Short.    Emily Rowan P.A.-C.             "

## 2018-05-24 NOTE — ASSESSMENT & PLAN NOTE
Chronic issue, has been on pravastatin for many years. Compliant with medication. Last lipid panel in July 2017.

## 2018-05-24 NOTE — LETTER
[facility]  Emily Rowan P.A.-C.  [address]  [place] [address]  Fax: [phone]   Authorization for Release/Disclosure of   Protected Health Information   Name: ALEJANDRA DOUGLAS : 1945 SSN: [id number]   Address: [address] [place] [address] [place] [address] Phone:    [phone] (home) [phone] (work)   I authorize the entity listed below to release/disclose the PHI below to:   [facility]/Emily Rowan P.A.-C. and Emily Rowan P.A.-C.   Provider or Entity Name:  [facility]   Address   City, [facility]   Phone:      Fax:  [phone]   Reason for request: continuity of care   Information to be released:    [  ] LAST COLONOSCOPY,  including any PATH REPORT and follow-up  [  ] LAST FIT/COLOGUARD RESULT [  ] LAST DEXA  [  ] LAST MAMMOGRAM  [  ] LAST PAP  [  ] LAST LABS [xx  ] RETINA EXAM REPORT  [  ] IMMUNIZATION RECORDS  [  ] Release all info      [  ] Check here and initial the line next to each item to release ALL health information INCLUDING  _____ Care and treatment for drug and / or alcohol abuse  _____ HIV testing, infection status, or AIDS  _____ Genetic Testing    DATES OF SERVICE OR TIME PERIOD TO BE DISCLOSED: _____________  I understand and acknowledge that:  * This Authorization may be revoked at any time by you in writing, except if your health information has already been used or disclosed.  * Your health information that will be used or disclosed as a result of you signing this authorization could be re-disclosed by the recipient. If this occurs, your re-disclosed health information may no longer be protected by State or Federal laws.  * You may refuse to sign this Authorization. Your refusal will not affect your ability to obtain treatment.  * This Authorization becomes effective upon signing and will  on (date) __________.      If no date is indicated, this Authorization will  one (1) year from the signature date.    Name: Alejandra  Radha Martinez    Signature:   Date:     5/24/2018       PLEASE FAX REQUESTED RECORDS BACK TO: [phone]

## 2018-05-24 NOTE — ASSESSMENT & PLAN NOTE
Diagnosed 7-8 years ago per patient. Currently treated with Meformin 500 mg BID which she is compliant with and tolerating well. Tries to follow diabetic diet as much as possible. Likes to walk, hasn't been quite as physically active as she'd like lately. Does not currently check fasting BS at home. Labs last month and would like to discuss results.

## 2018-05-24 NOTE — ASSESSMENT & PLAN NOTE
Gets flare-ups of eczema on palms of bilateral hands every few months. Uses fluocinonide cream as needed, which helps.

## 2018-08-20 ENCOUNTER — PATIENT OUTREACH (OUTPATIENT)
Dept: HEALTH INFORMATION MANAGEMENT | Facility: OTHER | Age: 73
End: 2018-08-20

## 2018-08-20 NOTE — PROGRESS NOTES
1. Attempt #:1    2. WebIZ Checked & Epic Updated: No WebIZ record  3. HealthConnect Verified: yes  Effective Date: 1/1/2018   4. Verify PCP: yes    5. Communication Preference Obtained: yes    6. Diabetes Visit Scheduling  Scheduling Status:Not Scheduled. Patient states they have already completed their AWV      7. Care Gap Scheduling (Attempt to Schedule EACH Overdue Care Gap!)    Health Maintenance Due   Topic Date Due   • RETINAL SCREENING  06/24/2017   • DIABETES MONOFILAMENT / LE EXAM  06/12/2018   • Annual Wellness Visit  06/13/2018   • FASTING LIPID PROFILE  07/27/2018   • URINE ACR / MICROALBUMIN  07/27/2018   • IMM INFLUENZA (1) 09/01/2018        8. Patient was directed to Health and Wellness Website: no     - Patient plans to schedule appointment for Retinal Eye Exam.    9. Screened for [employer] Prescription? yes  10. APX Activation: already active  11. APX Prosper: no  12. Virtual Visits: no  13. Opt In to Text Messages: no

## 2018-08-27 ENCOUNTER — TELEPHONE (OUTPATIENT)
Dept: MEDICAL GROUP | Facility: PHYSICIAN GROUP | Age: 73
End: 2018-08-27

## 2018-08-27 NOTE — TELEPHONE ENCOUNTER
Future Appointments       Provider Department Center    9/5/2018 8:00 AM Emily Rowan P.A.-C.; [facility]        ANNUAL WELLNESS VISIT PRE-VISIT PLANNING WITHOUT OUTREACH    1.  Reviewed note from last office visit with PCP: YES    2.  If any orders were placed at last visit, do we have Results/Consult Notes?        •  Labs - Labs were not ordered at last office visit..       •  Imaging - Imaging was not ordered at last office visit.       •  Referrals - No referrals were ordered at last office visit.    3.  Immunizations were updated in Epic using WebIZ?: No WebIZ record       •  WebIZ Recommendations: FLU and HEPATITIS B        •  Is patient due for Tdap? NO       •  Is patient due for Shingles? NO     4.  Patient is due for the following Health Maintenance Topics:   Health Maintenance Due   Topic Date Due   • IMM HEP B VACCINE (1 of 3 - Risk 3-dose series) 05[birth year]09/1964   • RETINAL SCREENING  06/24/2017   • DIABETES MONOFILAMENT / LE EXAM  06/12/2018   • Annual Wellness Visit  06/13/2018   • FASTING LIPID PROFILE  07/27/2018   • URINE ACR / MICROALBUMIN  07/27/2018   • IMM INFLUENZA (1) 09/01/2018       5.  Reviewed/Updated the following with patient:       •   Preferred Pharmacy? YES       •   Preferred Lab? YES       •   Preferred Communication? YES       •   Allergies? YES       •   Medications? YES. Was Abstract Encounter opened and chart updated? YES       •   Social History? YES. Was Abstract Encounter opened and chart updated? YES       •   Family History (document living status of immediate family members and if + hx of  cancer, diabetes, hypertension, hyperlipidemia, heart attack, stroke) YES. Was Abstract Encounter opened and chart updated? YES    6.  Care Team Updated:       •   DME Company (gait device, O2, CPAP, etc.): YES       •   Other Specialists (eye doctor, derm, GYN, cardiology, endo, etc): YES    7.  Patient has the following Care  Path diagnoses on Problem List:  DIABETES      8.  MDX printed and highlighted for Provider? NO complete and compliant on 04/02/18    9.  Patient was advised: “This is a free wellness visit. The provider will screen for medical conditions to help you stay healthy. If you have other concerns to address you may be asked to discuss these at a separate visit or there may be an additional fee.”     10.  Patient was informed to arrive 15 min prior to their scheduled appointment and bring in their medication bottles.

## 2018-09-05 ENCOUNTER — OFFICE VISIT (OUTPATIENT)
Dept: MEDICAL GROUP | Facility: PHYSICIAN GROUP | Age: 73
End: 2018-09-05
Payer: MEDICARE

## 2018-09-05 VITALS
BODY MASS INDEX: 21.17 KG/M2 | TEMPERATURE: 98.5 F | SYSTOLIC BLOOD PRESSURE: 110 MMHG | HEART RATE: 85 BPM | HEIGHT: 64 IN | DIASTOLIC BLOOD PRESSURE: 60 MMHG | WEIGHT: 124 LBS | OXYGEN SATURATION: 94 %

## 2018-09-05 DIAGNOSIS — N81.10 BLADDER PROLAPSE, FEMALE, ACQUIRED: ICD-10-CM

## 2018-09-05 DIAGNOSIS — Z00.00 MEDICARE ANNUAL WELLNESS VISIT, SUBSEQUENT: ICD-10-CM

## 2018-09-05 DIAGNOSIS — L30.9 ECZEMA OF BOTH HANDS: ICD-10-CM

## 2018-09-05 DIAGNOSIS — E78.00 HYPERCHOLESTEREMIA: ICD-10-CM

## 2018-09-05 DIAGNOSIS — E11.9 TYPE 2 DIABETES MELLITUS WITHOUT COMPLICATION, WITHOUT LONG-TERM CURRENT USE OF INSULIN (HCC): ICD-10-CM

## 2018-09-05 PROCEDURE — G0439 PPPS, SUBSEQ VISIT: HCPCS | Performed by: PHYSICIAN ASSISTANT

## 2018-09-05 ASSESSMENT — PATIENT HEALTH QUESTIONNAIRE - PHQ9: CLINICAL INTERPRETATION OF PHQ2 SCORE: 0

## 2018-09-05 ASSESSMENT — ENCOUNTER SYMPTOMS: GENERAL WELL-BEING: EXCELLENT

## 2018-09-05 ASSESSMENT — ACTIVITIES OF DAILY LIVING (ADL): BATHING_REQUIRES_ASSISTANCE: 0

## 2018-09-05 NOTE — ASSESSMENT & PLAN NOTE
Chronic issue, well-controlled with daily statin. Last lipid panel in 7/2017 reviewed which was normal. LDL at goal of < 70. Due for repeat lipid panel which I have ordered. In the meantime should continue current management.    Cholesterol,Tot 144  100 - 199 mg/dL Final   Triglycerides 76  0 - 149 mg/dL Final   HDL 69  >=40 mg/dL Final   LDL 60  <100 mg/dL Final

## 2018-09-05 NOTE — PROGRESS NOTES
Chief Complaint   Patient presents with   • Annual Wellness Visit         HPI:  Alejandra is a 73 y.o. here for Medicare Annual Wellness Visit. Is an established patient of mine.        Patient Active Problem List    Diagnosis Date Noted   • Type 2 diabetes mellitus without complication (HCC) 11/06/2014     Priority: High   • Eczema of both hands 05/24/2018   • Hypercholesteremia 11/06/2014       Current Outpatient Prescriptions   Medication Sig Dispense Refill   • metFORMIN (GLUCOPHAGE) 500 MG Tab Take 1 Tab by mouth 2 times a day. TAKE 1 TABLET BY MOUTH TWICE A  Tab 3   • pravastatin (PRAVACHOL) 20 MG Tab Take 1 Tab by mouth every day. 90 Tab 4   • fluocinonide (LIDEX) 0.05 % CREA Apply bid to affected area as needed 60 g 1     No current facility-administered medications for this visit.         Patient is taking medications as noted in medication list.  Current supplements as per medication list.     Allergies: Patient has no known allergies.    Current social contact/activities: Visit with family and friends      Is patient current with immunizations? No, due for FLU and HEPATITIS B. Patient is interested in receiving NONE today.    She  reports that she has never smoked. She has never used smokeless tobacco. She reports that she does not drink alcohol or use drugs.  Counseling given: Not Answered        DPA/Advanced directive: Patient does not have an Advanced Directive.  A packet and workshop information was given on Advanced Directives.    ROS:    Gait: Uses no assistive device   Ostomy: No   Other tubes: No   Amputations: No   Chronic oxygen use No   Last eye exam 11/2018   Wears hearing aids: No   : Reports urinary leakage during the last 6 months that has not interfered at all with their daily activities or sleep.      Screening:    DIABETES    Has patient ever had diabetes education? Yes, and is NOT interested in more at this time.            Depression Screening    Little interest or pleasure in  doing things?  0 - not at all  Feeling down, depressed, or hopeless? 0 - not at all  Patient Health Questionnaire Score: 0    If depressive symptoms identified deferred to follow up visit unless specifically addressed in assessment and plan.    Interpretation of PHQ-9 Total Score   Score Severity   1-4 No Depression   5-9 Mild Depression   10-14 Moderate Depression   15-19 Moderately Severe Depression   20-27 Severe Depression    Screening for Cognitive Impairment    Three Minute Recall (leader, season, table)  2/3 Leader season table   Mio clock face with all 12 numbers and set the hands to show 10 past 11.  Yes 11:10 5/5  If cognitive concerns identified, deferred for follow up unless specifically addressed in assessment and plan.    Fall Risk Assessment    Has the patient had two or more falls in the last year or any fall with injury in the last year?  No  If fall risk identified, deferred for follow up unless specifically addressed in assessment and plan.    Safety Assessment    Throw rugs on floor.  No  Handrails on all stairs.  Yes  Good lighting in all hallways.  Yes  Difficulty hearing.  No  Patient counseled about all safety risks that were identified.    Functional Assessment ADLs    Are there any barriers preventing you from cooking for yourself or meeting nutritional needs?  No.    Are there any barriers preventing you from driving safely or obtaining transportation?  No.    Are there any barriers preventing you from using a telephone or calling for help?  No.    Are there any barriers preventing you from shopping?  No.    Are there any barriers preventing you from taking care of your own finances?  No.    Are there any barriers preventing you from managing your medications?  No.    Are there any barriers preventing you from showering, bathing or dressing yourself?  No.    Are you currently engaging in any exercise or physical activity?  Yes.  Walking, bowling   What is your perception of your health?   Excellent.    Health Maintenance Summary                IMM HEP B VACCINE Overdue 5/9/1963     RETINAL SCREENING Overdue 6/24/2017      Done 6/24/2016 REFERRAL FOR RETINAL SCREENING EXAM     Patient has more history with this topic...    DIABETES MONOFILAMENT / LE EXAM Overdue 6/12/2018      Done 6/12/2017 AMB DIABETIC MONOFILAMENT LOWER EXTREMITY EXAM     Patient has more history with this topic...    Annual Wellness Visit Overdue 6/13/2018      Done 6/12/2017 Visit Dx: Medicare annual wellness visit, subsequent     Patient has more history with this topic...    FASTING LIPID PROFILE Overdue 7/27/2018      Done 7/27/2017 LIPID PROFILE     Patient has more history with this topic...    URINE ACR / MICROALBUMIN Overdue 7/27/2018      Done 7/27/2017 MICROALBUMIN CREAT RATIO URINE     Patient has more history with this topic...    IMM INFLUENZA Overdue 9/1/2018     IMM PNEUMOCOCCAL 65+ (ADULT) LOW/MEDIUM RISK SERIES Postponed 2/20/2019 Originally 5/9/2009. Patient Refused    IMM DTaP/Tdap/Td Vaccine Postponed 2/20/2019 Originally 5/9/1963. Patient Refused    IMM ZOSTER VACCINES Postponed 2/20/2019 Originally 5/9/1994. Patient Refused    A1C SCREENING Next Due 10/3/2018      Done 4/3/2018 HEMOGLOBIN A1C      Patient has more history with this topic...    MAMMOGRAM Next Due 1/20/2019      Done 1/20/2018 MA-MAMMO SCREENING BILAT W/TOMOSYNTHESIS W/CAD     Patient has more history with this topic...    SERUM CREATININE Next Due 4/3/2019      Done 4/3/2018 COMP METABOLIC PANEL     Patient has more history with this topic...    BONE DENSITY Next Due 11/6/2019      Postponed 11/6/2014 exam ordered    COLONOSCOPY Next Due 11/6/2024      Done 11/6/2014           Patient Care Team:  Emily Rowan P.A.-C. as PCP - General (Physician Assistant)  Rodrigue Enriquez M.D. as Consulting Physician (Ophthalmology)  For [facility] as Consulting Physician (Oncology)    Social History   Substance Use Topics   • Smoking status:  "Never Smoker   • Smokeless tobacco: Never Used      Comment: avoid all tobacco products   • Alcohol use No      Comment: little to none     Family History   Problem Relation Age of Onset   • Cancer Mother         breast   • Hyperlipidemia Mother    • Cancer Father         bladder   • Genetic Father         Parkinson's   • No Known Problems Sister    • No Known Problems Brother    • Cancer Paternal Aunt         breast   • Diabetes Neg Hx    • Stroke Neg Hx    • Lung Disease Neg Hx    • Heart Disease Neg Hx    • Hypertension Neg Hx      She  has a past medical history of Hypercholesteremia (11/6/2014) and Type II or unspecified type diabetes mellitus without mention of complication, not stated as uncontrolled (11/6/2014).   Past Surgical History:   Procedure Laterality Date   • APPENDECTOMY             Exam:     Blood pressure 110/60, pulse 85, temperature 36.9 °C (98.5 °F), height 1.626 m (5' 4\"), weight 56.2 kg (124 lb), SpO2 94 %. Body mass index is 21.28 kg/m².    Hearing good.    Dentition good  Alert, oriented in no acute distress.  Eye contact is good, speech goal directed, affect calm  Normal S1/S2, RRR  Lungs CTA bilat      Assessment and Plan. The following treatment and monitoring plan is recommended:        Eczema of both hands  Chronic issue, well-controlled with PRN fluocinonide cream. Continue current management.    Hypercholesteremia  Chronic issue, well-controlled with daily statin. Last lipid panel in 7/2017 reviewed which was normal. LDL at goal of < 70. Due for repeat lipid panel which I have ordered. In the meantime should continue current management.    Cholesterol,Tot 144  100 - 199 mg/dL Final   Triglycerides 76  0 - 149 mg/dL Final   HDL 69  >=40 mg/dL Final   LDL 60  <100 mg/dL Final         Type 2 diabetes mellitus without complication (HCC)  Chronic issue, well-controlled with BID metformin. Last A1c in 4/2018 was 6.2. Due for labs including lipid panel and microalbumin/creatinine ratio. Is " on low-dose lisinopril 2.5 mg daily which she states was started by previous doctor to protect her kidneys. She states that she has been experiencing periodic episodes throughout the day where she gets very weak and tired and finds it difficult to do anything except sit down and rest. Although she has never checked her blood pressure when she feels these symptoms, is concerned that she may be having low blood pressure. She states that her blood pressure has always run on the low side and has been even lower since she's been taking the lisinopril. Blood pressure today in office is 110/60 and at previous visit was 100/50. Agree that for her age, this is on the low side and symptoms she is experiencing may be related. Recommend d/c of lisinopril. Should let me know if weak/tired feeling resolves. Otherwise continue current management with metformin.      Patient is also requesting another referral to gynecology for bladder prolapse. She states that her previous PCP had put a referral in for her but it . I do see on review of chart that referral was placed back in January. I have reentered this referral.      Services suggested: No services needed at this time  Health Care Screening recommendations as per orders if indicated.  Referrals offered: PT/OT/Nutrition counseling/Behavioral Health/Smoking cessation as per orders if indicated.    Discussion today about general wellness and lifestyle habits:    · Prevent falls and reduce trip hazards; Cautioned about securing or removing rugs.  · Have a working fire alarm and carbon monoxide detector;   · Engage in regular physical activity and social activities       Follow-up: Return in about 6 months (around 3/5/2019) for f/u DM; Short.     Emily Rowan P.A.-C.

## 2018-09-05 NOTE — LETTER
[facility]  Emily Rowan P.A.-C.  [address]  [place] [address]  Fax: [phone]   Authorization for Release/Disclosure of   Protected Health Information   Name: ALEJANDRA DOUGLAS : 1945 SSN: [id number]   Address: [address] [place] [address]  [place] [address] Phone:    [phone] (home) [phone] (work)   I authorize the entity listed below to release/disclose the PHI below to:   [facility]/Emily Rowan P.A.-C. and Emily Rowan P.A.-C.   Provider or Entity Name: Rodrigue Enriquez M.D.     Address   City, State, Zip   Phone:      Fax: [phone]     Reason for request: continuity of care   Information to be released:    [  ] LAST COLONOSCOPY,  including any PATH REPORT and follow-up  [  ] LAST FIT/COLOGUARD RESULT [  ] LAST DEXA  [  ] LAST MAMMOGRAM  [  ] LAST PAP  [  ] LAST LABS [XXX ] RETINA EXAM REPORT  [  ] IMMUNIZATION RECORDS  [  ] Release all info      [  ] Check here and initial the line next to each item to release ALL health information INCLUDING  _____ Care and treatment for drug and / or alcohol abuse  _____ HIV testing, infection status, or AIDS  _____ Genetic Testing    DATES OF SERVICE OR TIME PERIOD TO BE DISCLOSED: _____________  I understand and acknowledge that:  * This Authorization may be revoked at any time by you in writing, except if your health information has already been used or disclosed.  * Your health information that will be used or disclosed as a result of you signing this authorization could be re-disclosed by the recipient. If this occurs, your re-disclosed health information may no longer be protected by State or Federal laws.  * You may refuse to sign this Authorization. Your refusal will not affect your ability to obtain treatment.  * This Authorization becomes effective upon signing and will  on (date) __________.      If no date is indicated, this Authorization will  one (1) year from the signature date.    Name: Alejandra  Radha Martinez    Signature:   Date:     9/5/2018       PLEASE FAX REQUESTED RECORDS BACK TO: [phone]

## 2018-09-05 NOTE — LETTER
Request for Medical Records    Patient Name: Alejandra Martinez    : 1945      Dear Doctor: Rodrigue Enriquez M.D.    The above named patient receives primary care at the [facility] by Emily Rowan P.A.-C..  The patient informs us that you are her eye care Provider.    Please fax a copy of the most recent eye exam to [phone] or answer the  questions below and fax this sheet back to us at the above number.  Attached is a signed Release of Information.      Date of last eye exam: _____________    Retinal eye exam summary:        Please select the choice(s) that apply.    ____ No diabetic retinopathy    ____    Diabetic retinopathy present      Printed Name and Credentials: __________________________________    Signature of Eye Care Provider: _________________________________    We appreciate your assistance and collaboration in providing efficient patient care!    Kindest Regards,    [facility]  [address]  [facility] [address] [phone]

## 2018-09-05 NOTE — ASSESSMENT & PLAN NOTE
Chronic issue, well-controlled with BID metformin. Last A1c in 4/2018 was 6.2. Due for labs including lipid panel and microalbumin/creatinine ratio. Is on low-dose lisinopril 2.5 mg daily which she states was started by previous doctor to protect her kidneys. She states that she has been experiencing periodic episodes throughout the day where she gets very weak and tired and finds it difficult to do anything except sit down and rest. Although she has never checked her blood pressure when she feels these symptoms, is concerned that she may be having low blood pressure. She states that her blood pressure has always run on the low side and has been even lower since she's been taking the lisinopril. Blood pressure today in office is 110/60 and at previous visit was 100/50. Agree that for her age, this is on the low side and symptoms she is experiencing may be related. Recommend d/c of lisinopril. Should let me know if weak/tired feeling resolves. Otherwise continue current management with metformin.

## 2018-09-11 ENCOUNTER — TELEPHONE (OUTPATIENT)
Dept: MEDICAL GROUP | Facility: PHYSICIAN GROUP | Age: 73
End: 2018-09-11

## 2018-09-11 DIAGNOSIS — E11.59 HYPERTENSION ASSOCIATED WITH DIABETES (HCC): ICD-10-CM

## 2018-09-11 DIAGNOSIS — I15.2 HYPERTENSION ASSOCIATED WITH DIABETES (HCC): ICD-10-CM

## 2018-09-11 RX ORDER — LOSARTAN POTASSIUM 25 MG/1
25 TABLET ORAL DAILY
Qty: 30 TAB | Refills: 2 | Status: SHIPPED | OUTPATIENT
Start: 2018-09-11 | End: 2018-10-18 | Stop reason: SDUPTHER

## 2018-09-11 NOTE — TELEPHONE ENCOUNTER
I can prescribe her low-dose losartan as alternative. Should take 1 tablet daily. Recommend follow-up with me in 3 months.  Emily Rowan P.A.-C.

## 2018-09-11 NOTE — TELEPHONE ENCOUNTER
Recommend that she go back on low-dose lisinopril. Does she still have at home or would she like me to re-order for her?  Emily Rowan P.A.-C.

## 2018-09-11 NOTE — TELEPHONE ENCOUNTER
Patient was recently taken off BP medications and pt states that she is feeling fine but her BP is rising. Most recent reading was 142/73.

## 2018-09-11 NOTE — TELEPHONE ENCOUNTER
Patient states she doesn't like how Lisinopril makes her feel and wants to know if she can cut the medication in half or maybe an alternative medication.

## 2018-10-18 DIAGNOSIS — E11.59 HYPERTENSION ASSOCIATED WITH DIABETES (HCC): ICD-10-CM

## 2018-10-18 DIAGNOSIS — I15.2 HYPERTENSION ASSOCIATED WITH DIABETES (HCC): ICD-10-CM

## 2018-10-18 RX ORDER — LOSARTAN POTASSIUM 25 MG/1
25 TABLET ORAL DAILY
Qty: 90 TAB | Refills: 1 | Status: SHIPPED | OUTPATIENT
Start: 2018-10-18 | End: 2019-04-19 | Stop reason: SDUPTHER

## 2018-10-18 NOTE — TELEPHONE ENCOUNTER
*PT NEEDS TO GET LABS UPDATED, PHARMACY REQUESTED 90 DAYS SUPPLY*  Was the patient seen in the last year in this department? Yes    Does patient have an active prescription for medications requested? Yes    Received Request Via: Pharmacy      Pt met protocol?: NO    LAST OV 09/05/2018    BP Readings from Last 1 Encounters:   09/05/18 110/60     Lab Results   Component Value Date/Time    CHOLSTRLTOT 144 07/27/2017 06:28 AM    LDL 60 07/27/2017 06:28 AM    HDL 69 07/27/2017 06:28 AM    TRIGLYCERIDE 76 07/27/2017 06:28 AM       Lab Results   Component Value Date/Time    SODIUM 139 04/03/2018 06:27 AM    POTASSIUM 4.1 04/03/2018 06:27 AM    CHLORIDE 105 04/03/2018 06:27 AM    CO2 29 04/03/2018 06:27 AM    GLUCOSE 92 04/03/2018 06:27 AM    BUN 17 04/03/2018 06:27 AM    CREATININE 0.75 04/03/2018 06:27 AM     Lab Results   Component Value Date/Time    ALKPHOSPHAT 50 04/03/2018 06:27 AM    ASTSGOT 14 04/03/2018 06:27 AM    ALTSGPT 11 04/03/2018 06:27 AM    TBILIRUBIN 0.4 04/03/2018 06:27 AM

## 2018-10-18 NOTE — TELEPHONE ENCOUNTER
Refill X 6 months, sent to pharmacy.Pt. Seen in the last 6 months per protocol.   Lab Results   Component Value Date/Time    SODIUM 139 04/03/2018 06:27 AM    POTASSIUM 4.1 04/03/2018 06:27 AM    CHLORIDE 105 04/03/2018 06:27 AM    CO2 29 04/03/2018 06:27 AM    GLUCOSE 92 04/03/2018 06:27 AM    BUN 17 04/03/2018 06:27 AM    CREATININE 0.75 04/03/2018 06:27 AM

## 2018-10-23 ENCOUNTER — GYNECOLOGY VISIT (OUTPATIENT)
Dept: OBGYN | Facility: MEDICAL CENTER | Age: 73
End: 2018-10-23
Payer: MEDICARE

## 2018-10-23 VITALS
SYSTOLIC BLOOD PRESSURE: 122 MMHG | BODY MASS INDEX: 21.16 KG/M2 | HEIGHT: 65 IN | WEIGHT: 127 LBS | DIASTOLIC BLOOD PRESSURE: 78 MMHG

## 2018-10-23 DIAGNOSIS — N81.10 PELVIC ORGAN PROLAPSE QUANTIFICATION STAGE 2 CYSTOCELE: ICD-10-CM

## 2018-10-23 PROCEDURE — 99203 OFFICE O/P NEW LOW 30 MIN: CPT | Mod: 25 | Performed by: OBSTETRICS & GYNECOLOGY

## 2018-10-23 PROCEDURE — 57160 INSERT PESSARY/OTHER DEVICE: CPT | Performed by: OBSTETRICS & GYNECOLOGY

## 2018-10-23 ASSESSMENT — ENCOUNTER SYMPTOMS
GASTROINTESTINAL NEGATIVE: 1
CONSTITUTIONAL NEGATIVE: 1
MUSCULOSKELETAL NEGATIVE: 1
COUGH: 1
NEUROLOGICAL NEGATIVE: 1
CARDIOVASCULAR NEGATIVE: 1
PSYCHIATRIC NEGATIVE: 1

## 2018-10-23 NOTE — PROGRESS NOTES
"Subjective:      Alejandra Martinez is a 73 y.o. female who presents with Other (Prolapse bladder)        HPI Patient has been dealing with urinary incontinence since approx 4 years ago. She loses urine with coughing, laughing or valsalva. She also experiences frequency and  loses urine before getting to the bathroom, approx 2 times per week. Patient states she wishes to have some therapy for this but declines surgical therapy as of right now.     Review of Systems   Constitutional: Negative.    HENT: Negative.    Respiratory: Positive for cough.         Occasional cough which exacerbates her incontinence.   Cardiovascular: Negative.    Gastrointestinal: Negative.    Genitourinary: Positive for frequency.        Frequency as above. Denies post menopausal bleeding.   Musculoskeletal: Negative.    Neurological: Negative.    Psychiatric/Behavioral: Negative.         Objective:     /78 (BP Location: Left arm, Patient Position: Sitting)   Ht 1.651 m (5' 5\")   Wt 57.6 kg (127 lb)   BMI 21.13 kg/m²      Physical Exam   Constitutional: She is oriented to person, place, and time. She appears well-developed and well-nourished.   HENT:   Head: Normocephalic and atraumatic.   Mouth/Throat: Oropharynx is clear and moist.   Eyes: Pupils are equal, round, and reactive to light. EOM are normal.   Neck: Normal range of motion. Neck supple.   Pulmonary/Chest: Effort normal and breath sounds normal.   Abdominal: Soft. She exhibits no distension. There is no tenderness. There is no guarding.   Genitourinary: Vagina normal and uterus normal.   Genitourinary Comments: Vaginal mucosa light pink, moist. Grade 2 cystocele, grade 1 rectocele. Uterus and cervix well supported. Slight narrowing of the introitus. Normal female hair distribution.    Musculoskeletal: Normal range of motion.   Neurological: She is alert and oriented to person, place, and time.   Skin: Skin is warm and dry.   Psychiatric: She has a normal mood and affect. " Her behavior is normal. Judgment and thought content normal.        Past Medical History:   Diagnosis Date   • Hypercholesteremia 11/6/2014   • Type II or unspecified type diabetes mellitus without mention of complication, not stated as uncontrolled 11/6/2014     Medications noted and reviewed in the chart.     Family History   Problem Relation Age of Onset   • Cancer Mother         breast   • Hyperlipidemia Mother    • Cancer Father         bladder   • Genetic Father         Parkinson's   • No Known Problems Sister    • No Known Problems Brother    • Cancer Paternal Aunt         breast   • Diabetes Neg Hx    • Stroke Neg Hx    • Lung Disease Neg Hx    • Heart Disease Neg Hx    • Hypertension Neg Hx        Assessment/Plan:     Stage 2 cystocele  - patient fitted for number 4 pessary in the office today. After ambulating, valsalva pessary remained in place. Patient was able to void after insertion of fitting pessary.   - order pessary with support   - return to clinic for insertion  - premarin 0.5 gram cream per nightly for erosion prevention.

## 2018-11-21 ENCOUNTER — TELEPHONE (OUTPATIENT)
Dept: OBGYN | Facility: MEDICAL CENTER | Age: 73
End: 2018-11-21

## 2018-11-21 NOTE — TELEPHONE ENCOUNTER
Pt called requesting to find out status on her devices (pesari) states last time her appt was canceled because devices weren't ready.    I called [facility] [place] [facility] and spoke w/ Emily, states she will call back to to notify us after speak w/Isaak .

## 2018-12-06 NOTE — TELEPHONE ENCOUNTER
Refill done. Please advise patient that she has several labs that are due. Already ordered so can do anytime. Needs to be fasting. Should schedule follow-up with me in March.  Emily Rowan P.A.-C.

## 2018-12-19 ENCOUNTER — GYNECOLOGY VISIT (OUTPATIENT)
Dept: OBGYN | Facility: MEDICAL CENTER | Age: 73
End: 2018-12-19
Payer: MEDICARE

## 2018-12-19 VITALS
BODY MASS INDEX: 21.3 KG/M2 | WEIGHT: 127.87 LBS | DIASTOLIC BLOOD PRESSURE: 60 MMHG | SYSTOLIC BLOOD PRESSURE: 103 MMHG | HEIGHT: 65 IN

## 2018-12-19 DIAGNOSIS — Z46.89 ENCOUNTER FOR FITTING AND ADJUSTMENT OF PESSARY: ICD-10-CM

## 2018-12-19 PROCEDURE — 57160 INSERT PESSARY/OTHER DEVICE: CPT | Performed by: OBSTETRICS & GYNECOLOGY

## 2018-12-19 NOTE — PROGRESS NOTES
Pessary Insertion:    Patient present for insertion and teaching of #4 Ring with support  Patient was taught to insert and remove pessary from standing and lying down position x 3 and asked to walk around and sit as well. She desires to return to office for cleanings q 3months.  Pt states pessary was comfortable and there was no slipping of the device  Pessary was checked for placement and fit after several minutes of mild activity and noted to be in place.    Pt is to call for any pain, discharge or bleeding from the device.  Pt desires not to use premarin due to reading box side effects. She agrees to use Trimo-sam jelly nightly

## 2018-12-19 NOTE — PROGRESS NOTES
Pt presents to have pessary inserted.Decided against using premarin cream due to risks.  # [phone]  [facility].

## 2018-12-28 ENCOUNTER — TELEPHONE (OUTPATIENT)
Dept: OBGYN | Facility: MEDICAL CENTER | Age: 73
End: 2018-12-28

## 2018-12-28 NOTE — TELEPHONE ENCOUNTER
Pt called c/o still has some liking and has to use a panty liner, wants to make sure that if this is normal?    In basket sent to Dr. Polk to advise.    I will call pt to notify after an answer.      Ghazala Polk D.O.  Jennifer Ortiz, Med Ass't             Sorry I don't understand. Is she having bleeding?   If so have her use Trimo-Hall jelly. I sent that for her already. If it persists have her make a follow up       Pt notified and advised to set up f/u appt.  States she will  Rx today, will call if any issues.

## 2019-01-14 ENCOUNTER — TELEPHONE (OUTPATIENT)
Dept: OBGYN | Facility: MEDICAL CENTER | Age: 74
End: 2019-01-14

## 2019-01-14 NOTE — TELEPHONE ENCOUNTER
Patient called back, stating MD placed a device and thinks it has moved out of the way. Patient was transferred to [facility] to get schedule for evaluation. Lucy took the call.

## 2019-01-15 ENCOUNTER — GYNECOLOGY VISIT (OUTPATIENT)
Dept: OBGYN | Facility: MEDICAL CENTER | Age: 74
End: 2019-01-15
Payer: MEDICARE

## 2019-01-15 VITALS
BODY MASS INDEX: 22.42 KG/M2 | DIASTOLIC BLOOD PRESSURE: 78 MMHG | SYSTOLIC BLOOD PRESSURE: 122 MMHG | WEIGHT: 134.59 LBS | HEIGHT: 65 IN

## 2019-01-15 DIAGNOSIS — N81.10 PELVIC ORGAN PROLAPSE QUANTIFICATION STAGE 2 CYSTOCELE: ICD-10-CM

## 2019-01-15 PROCEDURE — 57160 INSERT PESSARY/OTHER DEVICE: CPT | Performed by: OBSTETRICS & GYNECOLOGY

## 2019-01-15 PROCEDURE — 99213 OFFICE O/P EST LOW 20 MIN: CPT | Mod: 25 | Performed by: OBSTETRICS & GYNECOLOGY

## 2019-01-15 NOTE — PROGRESS NOTES
Chief Complaint   Patient presents with   • Gynecologic Exam     Pessary check       History of present illness: 73 y.o. presents with above chief complaint. sTates she was having cough and cold symptoms and felt as though the device moved. She denies it slipping out but states it does not feel right. Denies bleeding.     Review of systems:  Pertinent positives documented in HPI and all other systems reviewed & are negative    Constitutional: negative  Respiratory: negative  Cardiovascular: negative  Gastrointestinal: negative  Genitourinary:negative  Integument/breast: negative  Hematologic/lymphatic: negative  Musculoskeletal:negative  Neurological: negative    POBHx:   OB History    Para Term  AB Living   4 2     2     SAB TAB Ectopic Molar Multiple Live Births   2                # Outcome Date GA Lbr Erick/2nd Weight Sex Delivery Anes PTL Lv   4 SAB            3 SAB            2 Para            1 Para                 PGYNHx: pelvic organ prolapse as above    All PMH, PSH, allergies, social history and FH reviewed and updated today:  Past Medical History:   Diagnosis Date   • Hypercholesteremia 2014   • Type II or unspecified type diabetes mellitus without mention of complication, not stated as uncontrolled 2014       Past Surgical History:   Procedure Laterality Date   • APPENDECTOMY       Allergies: No Known Allergies    Social History     Social History   • Marital status: Single     Spouse name: N/A   • Number of children: N/A   • Years of education: N/A     Occupational History   • Not on file.     Social History Main Topics   • Smoking status: Never Smoker   • Smokeless tobacco: Never Used      Comment: avoid all tobacco products   • Alcohol use No      Comment: little to none   • Drug use: No   • Sexual activity: No     Other Topics Concern   • Not on file     Social History Narrative   • No narrative on file       Family History   Problem Relation Age of Onset   • Cancer Mother        "  breast   • Hyperlipidemia Mother    • Cancer Father         bladder   • Genetic Father         Parkinson's   • No Known Problems Sister    • No Known Problems Brother    • Cancer Paternal Aunt         breast   • Diabetes Neg Hx    • Stroke Neg Hx    • Lung Disease Neg Hx    • Heart Disease Neg Hx    • Hypertension Neg Hx        Physical exam:  Blood pressure 122/78, height 1.651 m (5' 5\"), weight 61 kg (134 lb 9.5 oz), not currently breastfeeding.    General:appears stated age, is in no apparent distress, is well developed and well nourished  Head: normocephalic, non-tender  Neck: neck is supple, no jugular venous distension, no thyromegaly  CV : regular rate and rhythm, no peripheral edema  Lungs: Normal respiratory effort. Clear to auscultation bilaterally  Abdomen: Bowel sounds positive, nondistended, soft, nontender x4, no rebound or guarding. No organomegaly. No masses.  Female GYN: normal female external genitalia without lesions, pessary noted to be below the pubic bone. It was removed and reinserted between the ischial spines above the pubic symphysis with teaching on how to check for correct placement.  Skin: No rashes, or ulcers or lesions seen  Psychiatric: Patient shows appropriate affect, is alert and oriented x3, intact judgment and insight.      Assessment  1. Pelvic organ prolapse quantification stage 2 cystocele  REFERRAL TO GYNECOLOGY       Plan  - Plan to order patient a larger pessary as this device did slip beneath the pubis. #5 with membrane and support ordered.  - Pt taught on self corrected placement  - Follow up 1 month for new pesary      "

## 2019-01-18 ENCOUNTER — TELEPHONE (OUTPATIENT)
Dept: MEDICAL GROUP | Facility: PHYSICIAN GROUP | Age: 74
End: 2019-01-18

## 2019-01-18 DIAGNOSIS — Z12.31 ENCOUNTER FOR SCREENING MAMMOGRAM FOR BREAST CANCER: ICD-10-CM

## 2019-01-18 NOTE — TELEPHONE ENCOUNTER
1. Caller Name: Alejandra Martinez                                           Call Back Number: [phone] (home) [phone] (work)      Patient approves a detailed voicemail message: N\A    2. SPECIFIC Action To Be Taken: Orders pending, please sign.    3. Diagnosis/Clinical Reason for Request: Screening for breast cancer     4. Specialty & Provider Name/Lab/Imaging Location: [facility]MO     5. Is appointment scheduled for requested order/referral: no    Patient was informed they will receive a return phone call from the office ONLY if there are any questions before processing their request. Advised to call back if they haven't received a call from the referral department in 5 days.      Called Pt. To outreach them about their lab work. Pt. Agreed to have lab work completed and would like us to order her yearly MAMMO.

## 2019-01-25 ENCOUNTER — HOSPITAL ENCOUNTER (OUTPATIENT)
Dept: RADIOLOGY | Facility: MEDICAL CENTER | Age: 74
End: 2019-01-25
Attending: PHYSICIAN ASSISTANT
Payer: MEDICARE

## 2019-01-25 DIAGNOSIS — Z12.31 ENCOUNTER FOR SCREENING MAMMOGRAM FOR BREAST CANCER: ICD-10-CM

## 2019-01-25 PROCEDURE — 77063 BREAST TOMOSYNTHESIS BI: CPT

## 2019-02-01 ENCOUNTER — HOSPITAL ENCOUNTER (OUTPATIENT)
Dept: LAB | Facility: MEDICAL CENTER | Age: 74
End: 2019-02-01
Attending: PHYSICIAN ASSISTANT
Payer: MEDICARE

## 2019-02-01 DIAGNOSIS — E78.00 HYPERCHOLESTEREMIA: ICD-10-CM

## 2019-02-01 DIAGNOSIS — E11.9 TYPE 2 DIABETES MELLITUS WITHOUT COMPLICATION, WITHOUT LONG-TERM CURRENT USE OF INSULIN (HCC): ICD-10-CM

## 2019-02-01 LAB
CHOLEST SERPL-MCNC: 171 MG/DL (ref 100–199)
CREAT UR-MCNC: 57.3 MG/DL
EST. AVERAGE GLUCOSE BLD GHB EST-MCNC: 131 MG/DL
FASTING STATUS PATIENT QL REPORTED: NORMAL
HBA1C MFR BLD: 6.2 % (ref 0–5.6)
HDLC SERPL-MCNC: 78 MG/DL
LDLC SERPL CALC-MCNC: 79 MG/DL
MICROALBUMIN UR-MCNC: <0.7 MG/DL
MICROALBUMIN/CREAT UR: NORMAL MG/G (ref 0–30)
TRIGL SERPL-MCNC: 68 MG/DL (ref 0–149)

## 2019-02-01 PROCEDURE — 83036 HEMOGLOBIN GLYCOSYLATED A1C: CPT

## 2019-02-01 PROCEDURE — 80061 LIPID PANEL: CPT

## 2019-02-01 PROCEDURE — 82570 ASSAY OF URINE CREATININE: CPT

## 2019-02-01 PROCEDURE — 82043 UR ALBUMIN QUANTITATIVE: CPT

## 2019-02-01 PROCEDURE — 36415 COLL VENOUS BLD VENIPUNCTURE: CPT

## 2019-02-19 ENCOUNTER — TELEPHONE (OUTPATIENT)
Dept: OBGYN | Facility: CLINIC | Age: 74
End: 2019-02-19

## 2019-02-20 NOTE — TELEPHONE ENCOUNTER
Returning patients call,    Per patient she has been waiting for her ring to be delivered at the [facility]. Per patient she was told this would take a week to arrive. Patient has not heard back from the office.     I tried to call the  office, but they had left as of 4:58p,. Message was left for Jennifer to follow on tomorrow.    Patient was told the girls at that office have left and someone would follow up with her tomorrow. Patient was ok with this and had no further questions.

## 2019-02-22 DIAGNOSIS — E78.00 HYPERCHOLESTEREMIA: ICD-10-CM

## 2019-02-25 RX ORDER — PRAVASTATIN SODIUM 20 MG
20 TABLET ORAL DAILY
Qty: 90 TAB | Refills: 3 | Status: SHIPPED | OUTPATIENT
Start: 2019-02-25 | End: 2019-12-02 | Stop reason: SDUPTHER

## 2019-02-25 NOTE — TELEPHONE ENCOUNTER
Pt has had OV within the 12 month protocol and lipid panel is current. 12 month supply sent to pharmacy.   Lab Results   Component Value Date/Time    CHOLSTRLTOT 171 02/01/2019 06:10 AM    LDL 79 02/01/2019 06:10 AM    HDL 78 02/01/2019 06:10 AM    TRIGLYCERIDE 68 02/01/2019 06:10 AM       Lab Results   Component Value Date/Time    SODIUM 139 04/03/2018 06:27 AM    POTASSIUM 4.1 04/03/2018 06:27 AM    CHLORIDE 105 04/03/2018 06:27 AM    CO2 29 04/03/2018 06:27 AM    GLUCOSE 92 04/03/2018 06:27 AM    BUN 17 04/03/2018 06:27 AM    CREATININE 0.75 04/03/2018 06:27 AM     Lab Results   Component Value Date/Time    ALKPHOSPHAT 50 04/03/2018 06:27 AM    ASTSGOT 14 04/03/2018 06:27 AM    ALTSGPT 11 04/03/2018 06:27 AM    TBILIRUBIN 0.4 04/03/2018 06:27 AM

## 2019-02-25 NOTE — TELEPHONE ENCOUNTER
Was the patient seen in the last year in this department? Yes    Does patient have an active prescription for medications requested? Yes    Received Request Via: Patient   Please refill ASAP

## 2019-02-28 ENCOUNTER — GYNECOLOGY VISIT (OUTPATIENT)
Dept: OBGYN | Facility: MEDICAL CENTER | Age: 74
End: 2019-02-28
Payer: MEDICARE

## 2019-02-28 VITALS
BODY MASS INDEX: 21.49 KG/M2 | WEIGHT: 129 LBS | DIASTOLIC BLOOD PRESSURE: 60 MMHG | HEIGHT: 65 IN | SYSTOLIC BLOOD PRESSURE: 110 MMHG

## 2019-02-28 DIAGNOSIS — Z46.89 ENCOUNTER FOR FITTING AND ADJUSTMENT OF PESSARY: ICD-10-CM

## 2019-02-28 PROCEDURE — 57160 INSERT PESSARY/OTHER DEVICE: CPT | Performed by: OBSTETRICS & GYNECOLOGY

## 2019-02-28 NOTE — PROGRESS NOTES
Pessary Insertion:    Patient present for insertion and teaching of #5 Ring with membrane and support  Pessary inserted without difficulty.   Pt states pessary was comfortable and there was no slipping of the device  Pessary was checked for placement and fit after several minutes of mild activity and noted to be in place.    Pt is to call for any pain, discharge or bleeding from the device. Continue to use premarin prn irritation from pessary. Discussed increased vaginal discharge due to device.   Fu in 3mo for pessary changing

## 2019-04-15 ENCOUNTER — TELEPHONE (OUTPATIENT)
Dept: MEDICAL GROUP | Facility: PHYSICIAN GROUP | Age: 74
End: 2019-04-15

## 2019-04-15 NOTE — TELEPHONE ENCOUNTER
Future Appointments       Provider Department Center    4/16/2019 8:25 AM mEily Rowan P.A.-C. [facility]        ESTABLISHED PATIENT PRE-VISIT PLANNING     Patient was NOT contacted to complete PVP.     Note: Patient will not be contacted if there is no indication to call.     1.  Reviewed notes from the last few office visits within the medical group: Yes    2.  If any orders were placed at last visit or intended to be done for this visit (i.e. 6 mos follow-up), do we have Results/Consult Notes?        •  Labs - Labs ordered, completed on 02/01/2019 and results are in chart.       •  Imaging - Imaging ordered, completed and results are in chart.       •  Referrals - No referrals were ordered at last office visit.    3. Is this appointment scheduled as a Hospital Follow-Up? No    4.  Immunizations were updated in Epic using WebIZ?: No WebIZ record       •  Web Iz Recommendations: HEPATITIS B, PREVNAR (PCV13) , TDAP and SHINGRIX (Shingles)    5.  Patient is due for the following Health Maintenance Topics:   Health Maintenance Due   Topic Date Due   • IMM HEP B VACCINE (1 of 3 - Risk 3-dose series) 05/09/1964   • IMM DTaP/Tdap/Td Vaccine (1 - Tdap) 05/09/1964   • IMM ZOSTER VACCINES (1 of 2) 05/09/1994   • IMM PNEUMOCOCCAL(1 of 2 - PCV13) 05/09/2009   • RETINAL SCREENING  06/21/2018   • SERUM CREATININE  04/03/2019         6. Orders for overdue Health Maintenance topics pended in Pre-Charting? YES    7.  AHA (MDX) form printed for Provider? YES    8.  Patient was informed to arrive 15 min prior to their scheduled appointment and bring in their medication bottles. Confirmed through automated call

## 2019-04-16 ENCOUNTER — OFFICE VISIT (OUTPATIENT)
Dept: MEDICAL GROUP | Facility: PHYSICIAN GROUP | Age: 74
End: 2019-04-16
Payer: MEDICARE

## 2019-04-16 VITALS
HEART RATE: 88 BPM | RESPIRATION RATE: 18 BRPM | TEMPERATURE: 98.7 F | OXYGEN SATURATION: 95 % | WEIGHT: 129 LBS | DIASTOLIC BLOOD PRESSURE: 74 MMHG | SYSTOLIC BLOOD PRESSURE: 118 MMHG | BODY MASS INDEX: 21.49 KG/M2 | HEIGHT: 65 IN

## 2019-04-16 DIAGNOSIS — L30.9 ECZEMA OF BOTH HANDS: ICD-10-CM

## 2019-04-16 DIAGNOSIS — I15.2 HYPERTENSION ASSOCIATED WITH DIABETES (HCC): ICD-10-CM

## 2019-04-16 DIAGNOSIS — E78.00 HYPERCHOLESTEREMIA: ICD-10-CM

## 2019-04-16 DIAGNOSIS — E11.9 TYPE 2 DIABETES MELLITUS WITHOUT COMPLICATION, WITHOUT LONG-TERM CURRENT USE OF INSULIN (HCC): ICD-10-CM

## 2019-04-16 DIAGNOSIS — Z02.4 ENCOUNTER FOR EXAMINATION FOR DRIVING LICENSE: ICD-10-CM

## 2019-04-16 DIAGNOSIS — E11.59 HYPERTENSION ASSOCIATED WITH DIABETES (HCC): ICD-10-CM

## 2019-04-16 PROCEDURE — 8041 PR SCP AHA: Performed by: PHYSICIAN ASSISTANT

## 2019-04-16 PROCEDURE — 99214 OFFICE O/P EST MOD 30 MIN: CPT | Performed by: PHYSICIAN ASSISTANT

## 2019-04-16 ASSESSMENT — PATIENT HEALTH QUESTIONNAIRE - PHQ9: CLINICAL INTERPRETATION OF PHQ2 SCORE: 0

## 2019-04-16 NOTE — PROGRESS NOTES
Subjective:     Alejandra Martinez is a 73 y.o. female here today for follow-up on diabetes and other chronic conditions and Annual Health Assessment. Is an established patient of mine.    HPI:    Patient presents today for routine follow-up on her chronic health problems. I last saw her in September 2018. She is also needing DMV physical form completed.    She is type 2 diabetic on metformin 500 mg BID. She is not currently checking home BS readings. Was previously on low-dose lisinopril (2.5 mg) but this was discontinued at last appointment due to symptomatic low blood pressure. After stopping, she developed elevation in BP so was re-started on low dose losartan (25 mg). She does continue to experience some lightheadedness at times which seems to be associated with lower blood pressure readings when she checks.    She continues on pravastatin 20 mg daily for hyperlipidemia. She does feel that this medication contributes to some lower extremity muscle fatigue and cramping, but states she has experienced this since starting statin many years ago.     Finally, patient continues to experience peeling of the skin of her hands. Has eczema. She continues to use PRN fluocinonide cream which she feels works reasonably well.      Health Maintenance Summary                IMM HEP B VACCINE Overdue 5/9/1964     IMM DTaP/Tdap/Td Vaccine Overdue 5/9/1963     IMM ZOSTER VACCINES Overdue 5/9/1994     IMM PNEUMOCOCCAL 65+ (ADULT) LOW/MEDIUM RISK SERIES Overdue 5/9/2009     RETINAL SCREENING Overdue 6/21/2018      Done 6/21/2017 REFERRAL FOR RETINAL SCREENING EXAM     Patient has more history with this topic...    SERUM CREATININE Overdue 4/3/2019      Done 4/3/2018 COMP METABOLIC PANEL     Patient has more history with this topic...    A1C SCREENING Next Due 8/1/2019      Done 2/1/2019 HEMOGLOBIN A1C      Patient has more history with this topic...    IMM INFLUENZA Next Due 9/1/2019     DIABETES MONOFILAMENT / LE EXAM Next Due  9/5/2019      Done 9/5/2018 AMB DIABETIC MONOFILAMENT LOWER EXTREMITY EXAM     Patient has more history with this topic...    Annual Wellness Visit Next Due 9/6/2019      Done 9/5/2018 Visit Dx: Medicare annual wellness visit, subsequent     Patient has more history with this topic...    BONE DENSITY Next Due 11/6/2019      Postponed 11/6/2014 exam ordered    MAMMOGRAM Next Due 1/25/2020      Done 1/25/2019 MA-SCREENING MAMMO BILAT W/TOMOSYNTHESIS W/CAD     Patient has more history with this topic...    FASTING LIPID PROFILE Next Due 2/1/2020      Done 2/1/2019 LIPID PROFILE     Patient has more history with this topic...    URINE ACR / MICROALBUMIN Next Due 2/1/2020      Done 2/1/2019 MICROALBUMIN CREAT RATIO URINE     Patient has more history with this topic...    COLONOSCOPY Next Due 11/6/2024      Done 11/6/2014            Annual Health Assessment Questions:     1.  Are you currently engaging in any exercise or physical activity? Yes    2.  How would you describe your mood or emotional well-being today? good    3.  Have you had any falls in the last year? No    4.  Have you noticed any problems with your balance or had difficulty walking? No    5.  In the last six months have you experienced any leakage of urine? No    6. DPA/Advanced Directive: Patient has Advanced Directive, but it is not on file. Instructed to bring in a copy to scan into their chart.    Current medicines (including changes today)  Current Outpatient Prescriptions   Medication Sig Dispense Refill   • pravastatin (PRAVACHOL) 20 MG Tab TAKE 1 TAB BY MOUTH EVERY DAY. 90 Tab 3   • Oxyquinoline-Sod Lauryl Sulf 0.025-0.01 % Gel Insert 1 Application in vagina at bedtime as needed. 1 Tube 11   • metFORMIN (GLUCOPHAGE) 500 MG Tab Take 1 Tab by mouth 2 times a day. (Patient taking differently: Take 500 mg by mouth every day.) 180 Tab 1   • conjugated estrogen (PREMARIN) 0.625 MG/GM Cream Insert 0.5 g in vagina every bedtime. 1 Tube 6   • losartan  "(COZAAR) 25 MG Tab Take 1 Tab by mouth every day. (Patient taking differently: Take 12.5 mg by mouth every day.) 90 Tab 1   • fluocinonide (LIDEX) 0.05 % CREA Apply bid to affected area as needed 60 g 1     No current facility-administered medications for this visit.        She  has a past medical history of Hypercholesteremia (11/6/2014) and Type II or unspecified type diabetes mellitus without mention of complication, not stated as uncontrolled (11/6/2014).    Patient has no known allergies.    She  reports that she has never smoked. She has never used smokeless tobacco. She reports that she does not drink alcohol or use drugs.  Counseling given: Not Answered      ROS   No chest pain, no shortness of breath, no abdominal pain.       Objective:     Physical Exam:  /74 (BP Location: Left arm, Patient Position: Sitting, BP Cuff Size: Adult)   Pulse 88   Temp 37.1 °C (98.7 °F) (Temporal)   Resp 18   Ht 1.651 m (5' 5\")   Wt 58.5 kg (129 lb)   SpO2 95%  Body mass index is 21.47 kg/m².     Constitutional: Alert, well-appearing, no distress.  Skin: Warm, dry, good turgor, no rashes in visible areas.  Eye: Equal, round and reactive, conjunctiva clear, lids normal.  ENMT: Lips without lesions, good dentition, oropharynx clear.  Neck: Trachea midline, no masses, no thyromegaly. No cervical or submandibular lymphadenopathy.  Respiratory: Unlabored respiratory effort, lungs clear to auscultation, no wheezes, no rhonchi.  Cardiovascular: Normal S1, S2, no murmur, no edema.  Psych: Alert and oriented x3, normal affect and mood.      Assessment and Plan:     1. Type 2 diabetes mellitus without complication, without long-term current use of insulin (HCC)  Chronic issue, very well-controlled on metformin 500 mg BID. Recent A1c 6.2. We discussed that this is a bit over-controlled for her age, so recommend decreasing metformin to 500 mg daily. Re-check A1c in 6 months.  - Comp Metabolic Panel; Future    2. Hypertension " associated with diabetes (HCC)  Chronic issue, well controlled with losartan 25 mg daily.  Despite that this is quite a low dose, she continues to endorse some lower blood pressure readings at home associated with lightheadedness.  I have asked her to cut her tablet in half to see if this improves the issue.  Will reassess at next appointment.    3. Hypercholesteremia  Chronic issue, well controlled with daily pravastatin 20 mg.  Explained that statins do have the potential to cause muscle side effects which she has noticed since starting statins many years ago.  She has been tolerating these fine and does not feel they are significantly bothersome to the point where she would want to try a different statin.  She will let me know if she changes her mind.    4.  Eczema of both hands  Chronic issue, well controlled with as needed fluocinonide cream.  Continue current management.    5.  Encounter for examination for driving license  I did complete her DMV physical form today.  Copy will be scanned into her chart.      Discussion today about general wellness and lifestyle habits:    · Engage in regular physical activity and social activities.  · Prevent falls and reduce trip hazards; using ambulatory aides, hearing and vision testing if appropriate.  · Steps to improve urinary incontinence.  · Advanced care planning.    Follow-Up: Return in about 6 months (around 10/16/2019).     Emily Rowan P.A.-C.           PLEASE NOTE: This dictation was created using voice recognition software. I have made every reasonable attempt to correct obvious errors, but I expect that there are errors of grammar and possibly content that I did not discover before finalizing the note.

## 2019-04-16 NOTE — PATIENT INSTRUCTIONS
Today, your Healthcare Provider may have discussed the following recommendations:    1. Exercise and Physical Activity  According to the American Heart Association, it is recommended to engage in physical activity regularly and to aim for 150 minutes of moderate-intensity aerobic activity per week.  Your Healthcare Provider may have recommended taking the stairs instead of the elevator, starting or maintaining a walking program or strength-training program.    2. Emotional Well-being  Mental and emotional well-being is essential to overall health.  Your Healthcare Provider may have encouraged you to build strong, positive relationships with family and friends, become more involved in your community (by volunteering or joining a spiritual community), or focus on self-care.    3. Fall and Injury Prevention  To prevent falls and injuries and also improve your balance, your Healthcare Provider may have suggested that you use a cane or walker, start an exercise of physical therapy program, or have your vision and/or hearing tested.    4. Urinary Leakage (Urinary Incontinence)  To control or manage the leakage of urine, your Healthcare Provider may have recommended you start bladder training exercises (such as Kegel exercises), a trial of a medication or a referral to see a specialist to discuss surgical options.

## 2019-04-19 DIAGNOSIS — E11.59 HYPERTENSION ASSOCIATED WITH DIABETES (HCC): ICD-10-CM

## 2019-04-19 DIAGNOSIS — I15.2 HYPERTENSION ASSOCIATED WITH DIABETES (HCC): ICD-10-CM

## 2019-04-19 NOTE — TELEPHONE ENCOUNTER
Was the patient seen in the last year in this department? Yes    Does patient have an active prescription for medications requested? No     Received Request Via: Pharmacy      Pt met protocol?: Yes    OV 4/19     BP Readings from Last 1 Encounters:   04/16/19 118/74

## 2019-04-21 RX ORDER — LOSARTAN POTASSIUM 25 MG/1
TABLET ORAL
Qty: 100 TAB | Refills: 1 | Status: SHIPPED | OUTPATIENT
Start: 2019-04-21 | End: 2019-11-04 | Stop reason: SDUPTHER

## 2019-04-21 NOTE — TELEPHONE ENCOUNTER
Refill X 6 months, sent to pharmacy.Pt. Seen in the last 6 months per protocol. Sent #100 with 1 RF.  Lab Results   Component Value Date/Time    SODIUM 139 04/03/2018 06:27 AM    POTASSIUM 4.1 04/03/2018 06:27 AM    CHLORIDE 105 04/03/2018 06:27 AM    CO2 29 04/03/2018 06:27 AM    GLUCOSE 92 04/03/2018 06:27 AM    BUN 17 04/03/2018 06:27 AM    CREATININE 0.75 04/03/2018 06:27 AM

## 2019-06-20 ENCOUNTER — TELEPHONE (OUTPATIENT)
Dept: MEDICAL GROUP | Facility: PHYSICIAN GROUP | Age: 74
End: 2019-06-20

## 2019-06-20 NOTE — TELEPHONE ENCOUNTER
1. Caller Name: Alejandra Martinez                                           Call Back Number: [phone] (home) [phone] (work)        Patient approves a detailed voicemail message: N\A    Pt called stating she recently had a Ring put in for her collapsed bladder. She believes her bladder has collapsed again and think she may also have an infection, she is experiencing pain, redness, itching and vaginal discharge. She called her OB office and cannot get in until end of July. I have scheduled Pt to see you on 7/1/19. Pt would like to know if you could help her with this or any recommendations. Please advise.

## 2019-06-20 NOTE — TELEPHONE ENCOUNTER
Unfortunately not a lot I can do without seeing her. Agree that she should be seen to check for UTI and vaginal infection. If symptoms are severe, recommend urgent care.  Emily Rowan P.A.-C.

## 2019-06-26 ENCOUNTER — TELEPHONE (OUTPATIENT)
Dept: MEDICAL GROUP | Facility: PHYSICIAN GROUP | Age: 74
End: 2019-06-26

## 2019-07-01 ENCOUNTER — HOSPITAL ENCOUNTER (OUTPATIENT)
Facility: MEDICAL CENTER | Age: 74
End: 2019-07-01
Attending: PHYSICIAN ASSISTANT
Payer: MEDICARE

## 2019-07-01 ENCOUNTER — OFFICE VISIT (OUTPATIENT)
Dept: MEDICAL GROUP | Facility: PHYSICIAN GROUP | Age: 74
End: 2019-07-01
Payer: MEDICARE

## 2019-07-01 VITALS
DIASTOLIC BLOOD PRESSURE: 82 MMHG | WEIGHT: 128 LBS | HEIGHT: 65 IN | BODY MASS INDEX: 21.33 KG/M2 | TEMPERATURE: 98 F | OXYGEN SATURATION: 96 % | HEART RATE: 78 BPM | RESPIRATION RATE: 18 BRPM | SYSTOLIC BLOOD PRESSURE: 126 MMHG

## 2019-07-01 DIAGNOSIS — J20.9 ACUTE BRONCHITIS, UNSPECIFIED ORGANISM: ICD-10-CM

## 2019-07-01 DIAGNOSIS — N76.0 VULVOVAGINITIS: ICD-10-CM

## 2019-07-01 PROCEDURE — 87480 CANDIDA DNA DIR PROBE: CPT

## 2019-07-01 PROCEDURE — 87660 TRICHOMONAS VAGIN DIR PROBE: CPT

## 2019-07-01 PROCEDURE — 99214 OFFICE O/P EST MOD 30 MIN: CPT | Performed by: PHYSICIAN ASSISTANT

## 2019-07-01 PROCEDURE — 87510 GARDNER VAG DNA DIR PROBE: CPT

## 2019-07-01 NOTE — PROGRESS NOTES
"Subjective:   Alejandra Martinez is a 74 y.o. female here today for vaginal soreness, cough/congestion. Is an established patient of mine.    HPI:    Alejandra presents to the office today with the following concerns:    1. She is experiencing redness and soreness in her vulvar area since about a week or so ago. Also noticing itching. Was having burning of her genital area with urination at the time symptoms first started, but that has resolved. Is having discharge since pessary was first placed but nothing that she feels is worse than her typical. She denies any bladder pain, cloudy/bloody urine, or increased urinary frequency/urgency from baseline. She is sexually active with one male partner. Denies concern for STDs.    She has history of bladder prolapse with placement of pessary last October. She was last seen in February and large pessary was placed at that time due to issues with slippage. She states that despite the larger size, she continues to feel as if it is out-of-place. She has an appointment with her gynecologist scheduled, but not until the end of the month. She was prescribed Premarin cream to use as-needed for irritation, but she hasn't been using this at all.    2. Had \"head cold\" 2 weeks ago which went away. Then 4-5 days later, she developed sore throat, voice hoarseness, and chest congestion and productive cough which started 4-5 days ago. Denies fever, sinus congestion, ear pain, wheezing, SOB, chest pain. Has been taking extra vitamin C, but no other OTC medication. She does feel that symptoms have already started improving, but she wanted to mention since she was coming in anyway.      Current medicines (including changes today)  Current Outpatient Prescriptions   Medication Sig Dispense Refill   • losartan (COZAAR) 25 MG Tab TAKE 1 TABLET BY MOUTH EVERY  Tab 1   • pravastatin (PRAVACHOL) 20 MG Tab TAKE 1 TAB BY MOUTH EVERY DAY. 90 Tab 3   • Oxyquinoline-Sod Lauryl Sulf 0.025-0.01 % Gel " "Insert 1 Application in vagina at bedtime as needed. 1 Tube 11   • metFORMIN (GLUCOPHAGE) 500 MG Tab Take 1 Tab by mouth 2 times a day. (Patient taking differently: Take 500 mg by mouth every day.) 180 Tab 1   • conjugated estrogen (PREMARIN) 0.625 MG/GM Cream Insert 0.5 g in vagina every bedtime. 1 Tube 6   • fluocinonide (LIDEX) 0.05 % CREA Apply bid to affected area as needed 60 g 1     No current facility-administered medications for this visit.      She  has a past medical history of Hypercholesteremia (11/6/2014) and Type II or unspecified type diabetes mellitus without mention of complication, not stated as uncontrolled (11/6/2014).    ROS  As per HPI.       Objective:     /82 (BP Location: Right arm, Patient Position: Sitting, BP Cuff Size: Adult)   Pulse 78   Temp 36.7 °C (98 °F) (Temporal)   Resp 18   Ht 1.651 m (5' 5\")   Wt 58.1 kg (128 lb)   SpO2 96%  Body mass index is 21.3 kg/m².     Physical Exam:  Constitutional: Alert, well-appearing, no distress.  Skin: Warm, dry, good turgor, no rashes in visible areas.  Eye: Pupils are equal and round, conjunctiva clear, lids normal.  ENMT: Right external ear canal with moderate cerumen obstructing view of TM. Left EAC appears normal with minimal wax. Normal--appearing TM. Nasal turbinates appear non-inflamed, non-injected with no rhinorrhea. Lips without lesions, moist mucus membranes. No pharyngeal erythema.  Neck: No masses. No submandibular or cervical lymphadenopathy.  Respiratory: Unlabored respiratory effort, SpO2 96% on room air, lungs clear to auscultation, no wheezes, no rhonchi.  Cardiovascular: Normal S1, S2, no murmur.  Genitourinary: Vulva appears mildly erythematous and inflamed, most evident over labia minora. There is no visible discharge.     A chaperone was offered to the patient during today's exam. Patient declined chaperone.      Assessment and Plan:   The following treatment plan was discussed    1. Vulvovaginitis  New problem, " uncontrolled. Will check Affirm to rule out infection. I have advised her to start using Premarin cream and follow up as scheduled later this month with gynecology.    2. Acute bronchitis, unspecified organism  New problem, uncontrolled. History and exam are consistent with post-infectious bronchitis, likely viral in etiology. We discussed that this is typically self-limited illness. Antibiotics are not recommended at this time. She has been improving on her own. Supportive cares discussed.      Followup: Return if symptoms worsen or fail to improve.    Emily Rowan P.A.-C.

## 2019-07-01 NOTE — PATIENT INSTRUCTIONS

## 2019-07-02 LAB
CANDIDA DNA VAG QL PROBE+SIG AMP: NEGATIVE
G VAGINALIS DNA VAG QL PROBE+SIG AMP: NEGATIVE
T VAGINALIS DNA VAG QL PROBE+SIG AMP: NEGATIVE

## 2019-07-25 ENCOUNTER — GYNECOLOGY VISIT (OUTPATIENT)
Dept: OBGYN | Facility: MEDICAL CENTER | Age: 74
End: 2019-07-25
Payer: MEDICARE

## 2019-07-25 VITALS — DIASTOLIC BLOOD PRESSURE: 78 MMHG | WEIGHT: 130 LBS | BODY MASS INDEX: 21.63 KG/M2 | SYSTOLIC BLOOD PRESSURE: 118 MMHG

## 2019-07-25 DIAGNOSIS — N81.10 POP-Q STAGE 2 CYSTOCELE: ICD-10-CM

## 2019-07-25 PROCEDURE — 99212 OFFICE O/P EST SF 10 MIN: CPT | Performed by: OBSTETRICS & GYNECOLOGY

## 2019-07-25 NOTE — PROGRESS NOTES
Chief Complaint   Patient presents with   • Gynecologic Exam       History of present illness: 74 y.o. presents for examination of her pessary. She got bronchiitis recently and was coughing and states the bladder prolapsed out. Otherwise she is happy with the device and states she cannot feel it during the day.   Using premarin which helps with vaginal moisture.    Review of systems:  Pertinent positives documented in HPI and all other systems reviewed & are negative    Constitutional: negative  Respiratory: negative  Cardiovascular: negative  Gastrointestinal: negative  Genitourinary:negative    POBHx:   OB History    Para Term  AB Living   4 2     2     SAB TAB Ectopic Molar Multiple Live Births   2                # Outcome Date GA Lbr Erick/2nd Weight Sex Delivery Anes PTL Lv   4 SAB            3 SAB            2 Para            1 Para                   All PMH, PSH, allergies, social history and FH reviewed and updated today:  Past Medical History:   Diagnosis Date   • Hypercholesteremia 2014   • Type II or unspecified type diabetes mellitus without mention of complication, not stated as uncontrolled 2014       Past Surgical History:   Procedure Laterality Date   • APPENDECTOMY         Allergies: No Known Allergies    Social History     Social History   • Marital status: Single     Spouse name: N/A   • Number of children: N/A   • Years of education: N/A     Occupational History   • Not on file.     Social History Main Topics   • Smoking status: Never Smoker   • Smokeless tobacco: Never Used      Comment: avoid all tobacco products   • Alcohol use No      Comment: little to none   • Drug use: No   • Sexual activity: No     Other Topics Concern   • Not on file     Social History Narrative   • No narrative on file       Family History   Problem Relation Age of Onset   • Cancer Mother         breast   • Hyperlipidemia Mother    • Cancer Father         bladder   • Genetic Father          Parkinson's   • No Known Problems Sister    • No Known Problems Brother    • Cancer Paternal Aunt         breast   • Diabetes Neg Hx    • Stroke Neg Hx    • Lung Disease Neg Hx    • Heart Disease Neg Hx    • Hypertension Neg Hx        Physical exam:  /78   Wt 59 kg (130 lb)     General:appears stated age, is in no apparent distress  Head: normocephalic, non-tender  Neck: neck is supple, no jugular venous distension  CV : regular rate and rhythm, no peripheral edema  Lungs: Normal respiratory effort. Clear to auscultation bilaterally  Abdomen: Bowel sounds positive, nondistended, soft, nontender x4, no rebound or guarding. No organomegaly. No masses.  Female GYN: #5 ring removed and patient fitted with #6 fitter. She was asked to walk, cough, and squat which she tolerated well. Pessary remained in place.  Skin: No rashes, or ulcers or lesions seen  Psychiatric: Patient shows appropriate affect, is alert and oriented x3, intact judgment and insight.    Assessment  1. POP-Q stage 2 cystocele  REFERRAL TO GYNECOLOGY     Plan  - 74 y.o. Postmenopausal patient here for pessary re-fitting.   - Patient in new relationship recently. Discussed that she may engage in intercourse with pessary in place. If she or her partner feels uncomfortable with the device would order a #6 WITHOUT membrane.   - Briefly discussed surgical options after patient questions. She does desire to avoid surgical intervention at this time  - Follow up for new pessary.

## 2019-08-09 ENCOUNTER — GYNECOLOGY VISIT (OUTPATIENT)
Dept: OBGYN | Facility: MEDICAL CENTER | Age: 74
End: 2019-08-09
Payer: MEDICARE

## 2019-08-09 VITALS — DIASTOLIC BLOOD PRESSURE: 68 MMHG | BODY MASS INDEX: 21.3 KG/M2 | SYSTOLIC BLOOD PRESSURE: 112 MMHG | WEIGHT: 128 LBS

## 2019-08-09 DIAGNOSIS — Z46.89 PESSARY MAINTENANCE: ICD-10-CM

## 2019-08-09 PROCEDURE — 99212 OFFICE O/P EST SF 10 MIN: CPT | Performed by: NURSE PRACTITIONER

## 2019-08-09 NOTE — PROGRESS NOTES
Pessary insert today    Patient was refitted since prior pessary was slipping. Desires pessary without the membrane so she can have intercourse. Is using premarin cream. Old pessary removed. New pessary easily inserted. Patient had opportunity to walk around, squat, valsalva and seems happy with the device. She will return in one month for fitting check, cleaning and maintenance. Sooner if the need arises.

## 2019-08-19 ENCOUNTER — HOSPITAL ENCOUNTER (OUTPATIENT)
Dept: LAB | Facility: MEDICAL CENTER | Age: 74
End: 2019-08-19
Attending: PHYSICIAN ASSISTANT
Payer: MEDICARE

## 2019-08-19 DIAGNOSIS — E11.9 TYPE 2 DIABETES MELLITUS WITHOUT COMPLICATION, WITHOUT LONG-TERM CURRENT USE OF INSULIN (HCC): ICD-10-CM

## 2019-08-19 LAB
ALBUMIN SERPL BCP-MCNC: 3.9 G/DL (ref 3.2–4.9)
ALBUMIN/GLOB SERPL: 1.4 G/DL
ALP SERPL-CCNC: 67 U/L (ref 30–99)
ALT SERPL-CCNC: 11 U/L (ref 2–50)
ANION GAP SERPL CALC-SCNC: 8 MMOL/L (ref 0–11.9)
AST SERPL-CCNC: 12 U/L (ref 12–45)
BILIRUB SERPL-MCNC: 0.5 MG/DL (ref 0.1–1.5)
BUN SERPL-MCNC: 14 MG/DL (ref 8–22)
CALCIUM SERPL-MCNC: 8.9 MG/DL (ref 8.5–10.5)
CHLORIDE SERPL-SCNC: 105 MMOL/L (ref 96–112)
CO2 SERPL-SCNC: 28 MMOL/L (ref 20–33)
CREAT SERPL-MCNC: 0.81 MG/DL (ref 0.5–1.4)
FASTING STATUS PATIENT QL REPORTED: NORMAL
GLOBULIN SER CALC-MCNC: 2.7 G/DL (ref 1.9–3.5)
GLUCOSE SERPL-MCNC: 89 MG/DL (ref 65–99)
POTASSIUM SERPL-SCNC: 4.2 MMOL/L (ref 3.6–5.5)
PROT SERPL-MCNC: 6.6 G/DL (ref 6–8.2)
SODIUM SERPL-SCNC: 141 MMOL/L (ref 135–145)

## 2019-08-19 PROCEDURE — 80053 COMPREHEN METABOLIC PANEL: CPT

## 2019-08-19 PROCEDURE — 36415 COLL VENOUS BLD VENIPUNCTURE: CPT

## 2019-09-11 ENCOUNTER — GYNECOLOGY VISIT (OUTPATIENT)
Dept: OBGYN | Facility: MEDICAL CENTER | Age: 74
End: 2019-09-11
Payer: MEDICARE

## 2019-09-11 VITALS — SYSTOLIC BLOOD PRESSURE: 110 MMHG | DIASTOLIC BLOOD PRESSURE: 64 MMHG | BODY MASS INDEX: 21.47 KG/M2 | WEIGHT: 129 LBS

## 2019-09-11 DIAGNOSIS — Z46.89 PESSARY MAINTENANCE: ICD-10-CM

## 2019-09-11 PROCEDURE — 99212 OFFICE O/P EST SF 10 MIN: CPT | Performed by: NURSE PRACTITIONER

## 2019-09-11 NOTE — PROGRESS NOTES
S) Patient is here for pessary check and maintenance. She is very happy with the device. She uses the premarin cream when she remembers. She reports history of cold sores and states she has very small sore left between labia minora and labia majora. She states she has a hx of cold sores. She has no fevers, chills or abnormal discharge, no vaginal bleeding  O) vaginal palpation of pessary firmly in place with valsalva and cough. Small non-ulcerated lesion noted  A) successful pessary removal/clean/replacement. Appropriate size       Likely HSV lesion  P) I have offered patient acyclovir which she declines. She is sure it will resolve on its own. She will continue premarin cream. She will return in 3-4 months for pessary maintenance.

## 2019-09-13 NOTE — TELEPHONE ENCOUNTER
*PER NEW INS PROTOCOL, NEEDS TO BE DONE FOR 100DAYS SUPPLY*  *PT NEEDS TO GET LABS UPDATED*  Was the patient seen in the last year in this department? Yes    Does patient have an active prescription for medications requested? No     Received Request Via: Pharmacy      Pt met protocol?: NO    LAST OV 07/01/2019    Lab Results   Component Value Date/Time    HBA1C 6.2 (H) 02/01/2019 0610     Lab Results   Component Value Date/Time    AVGLUC 131 02/01/2019 0610     Lab Results   Component Value Date/Time    CHOLSTRLTOT 171 02/01/2019 0610     Lab Results   Component Value Date/Time    TRIGLYCERIDE 68 02/01/2019 0610     No components found for: HLD  Lab Results   Component Value Date/Time    LDL 79 02/01/2019 0610

## 2019-10-14 ENCOUNTER — TELEPHONE (OUTPATIENT)
Dept: MEDICAL GROUP | Facility: PHYSICIAN GROUP | Age: 74
End: 2019-10-14

## 2019-10-14 NOTE — TELEPHONE ENCOUNTER
Future Appointments       Provider Department Center    10/21/2019 8:05 AM Emily Rowan P.A.-C. [facility]        ESTABLISHED PATIENT PRE-VISIT PLANNING     Patient was NOT contacted to complete PVP.     Note: Patient will not be contacted if there is no indication to call.     1.  Reviewed notes from the last few office visits within the medical group: Yes    2.  If any orders were placed at last visit or intended to be done for this visit (i.e. 6 mos follow-up), do we have Results/Consult Notes?        •  Labs - Labs ordered, completed on 08/19/2019 and results are in chart.   Note: If patient appointment is for lab review and patient did not complete labs, check with provider if OK to reschedule patient until labs completed.       •  Imaging - Imaging was not ordered at last office visit.       •  Referrals - No referrals were ordered at last office visit.    3. Is this appointment scheduled as a Hospital Follow-Up? No    4.  Immunizations were updated in Epic using WebIZ?: No WebIZ record       •  Web Iz Recommendations: FLU, HEPATITIS B, PREVNAR (PCV13) , TDAP and SHINGRIX (Shingles)    5.  Patient is due for the following Health Maintenance Topics:   Health Maintenance Due   Topic Date Due   • IMM HEP B VACCINE (1 of 3 - Risk 3-dose series) 05/09/1964   • IMM DTaP/Tdap/Td Vaccine (1 - Tdap) 05/09/1964   • IMM ZOSTER VACCINES (1 of 2) 05/09/1994   • IMM PNEUMOCOCCAL VACCINE: 65+ Years (1 of 2 - PCV13) 05/09/2009   • A1C SCREENING  08/01/2019   • IMM INFLUENZA (1) 09/01/2019   • DIABETES MONOFILAMENT / LE EXAM  09/05/2019   • Annual Wellness Visit  09/06/2019   • BONE DENSITY  11/06/2019       6. Orders for overdue Health Maintenance topics pended in Pre-Charting? YES    7.  AHA (MDX) form printed for Provider? No, already completed    8.  Patient was informed to arrive 15 min prior to their scheduled appointment and bring in their medication bottles.

## 2019-10-21 ENCOUNTER — OFFICE VISIT (OUTPATIENT)
Dept: MEDICAL GROUP | Facility: PHYSICIAN GROUP | Age: 74
End: 2019-10-21
Payer: MEDICARE

## 2019-10-21 VITALS
BODY MASS INDEX: 22.33 KG/M2 | WEIGHT: 134 LBS | HEIGHT: 65 IN | OXYGEN SATURATION: 94 % | DIASTOLIC BLOOD PRESSURE: 70 MMHG | TEMPERATURE: 98.6 F | HEART RATE: 90 BPM | SYSTOLIC BLOOD PRESSURE: 122 MMHG

## 2019-10-21 DIAGNOSIS — E78.00 HYPERCHOLESTEREMIA: ICD-10-CM

## 2019-10-21 DIAGNOSIS — E11.9 TYPE 2 DIABETES MELLITUS WITHOUT COMPLICATION, WITHOUT LONG-TERM CURRENT USE OF INSULIN (HCC): ICD-10-CM

## 2019-10-21 DIAGNOSIS — Z23 NEED FOR VACCINATION: ICD-10-CM

## 2019-10-21 DIAGNOSIS — I15.2 HYPERTENSION ASSOCIATED WITH DIABETES (HCC): ICD-10-CM

## 2019-10-21 DIAGNOSIS — L30.9 ECZEMA OF BOTH HANDS: ICD-10-CM

## 2019-10-21 DIAGNOSIS — E11.59 HYPERTENSION ASSOCIATED WITH DIABETES (HCC): ICD-10-CM

## 2019-10-21 LAB
HBA1C MFR BLD: 6 % (ref 0–5.6)
INT CON NEG: ABNORMAL
INT CON POS: ABNORMAL

## 2019-10-21 PROCEDURE — G0008 ADMIN INFLUENZA VIRUS VAC: HCPCS | Performed by: PHYSICIAN ASSISTANT

## 2019-10-21 PROCEDURE — 90670 PCV13 VACCINE IM: CPT | Performed by: PHYSICIAN ASSISTANT

## 2019-10-21 PROCEDURE — 90662 IIV NO PRSV INCREASED AG IM: CPT | Performed by: PHYSICIAN ASSISTANT

## 2019-10-21 PROCEDURE — G0009 ADMIN PNEUMOCOCCAL VACCINE: HCPCS | Performed by: PHYSICIAN ASSISTANT

## 2019-10-21 PROCEDURE — 83036 HEMOGLOBIN GLYCOSYLATED A1C: CPT | Performed by: PHYSICIAN ASSISTANT

## 2019-10-21 PROCEDURE — 99214 OFFICE O/P EST MOD 30 MIN: CPT | Mod: 25 | Performed by: PHYSICIAN ASSISTANT

## 2019-10-21 NOTE — PROGRESS NOTES
Subjective:   Alejandra Martinez is a 74 y.o. female here today for follow-up on diabetes and other chronic conditions. Is an established patient of mine.    HPI:    Patient presents to the office today for routine follow-up.  I last saw her in July.  She denies any changes to her health or new concerns since that time.    She continues on metformin for her type 2 diabetes.  I previously decreased her dose to only 1 500 mg tablet daily, as her A1c has been very stable.  Last check in February of this year was 6.2.  She does admit that her diet has been somewhat poor since I saw her last.  She is in a new relationship and her significant other makes her meals, which are often high in carbohydrates.  She also has not been exercising.    She continues on losartan for hypertension.  She has been unable to tolerate more than one half of a 25 mg tablet which she is doing well on this.  She endorses occasional episodes of weakness, but much improved since decreasing the dose.    She also continues on pravastatin 20 mg daily for hyperlipidemia.  She has been on this for many years and continues to tolerate fine.    Is requesting refill of Lidex cream which she uses on occasion for hand eczema.  She states that she was recently in [place] and her hands have looked better since that time.  She does also regularly use a diabetic hand cream.        Current medicines (including changes today)  Current Outpatient Medications   Medication Sig Dispense Refill   • fluocinonide (LIDEX) 0.05 % Cream Apply bid to affected area as needed 60 g 1   • NON SPECIFIED 0.5 mL by Intramuscular route Once for 1 dose. 1 Each 1   • losartan (COZAAR) 25 MG Tab TAKE 1 TABLET BY MOUTH EVERY  Tab 1   • pravastatin (PRAVACHOL) 20 MG Tab TAKE 1 TAB BY MOUTH EVERY DAY. 90 Tab 3   • Oxyquinoline-Sod Lauryl Sulf 0.025-0.01 % Gel Insert 1 Application in vagina at bedtime as needed. 1 Tube 11   • conjugated estrogen (PREMARIN) 0.625 MG/GM Cream  "Insert 0.5 g in vagina every bedtime. 1 Tube 6     No current facility-administered medications for this visit.      She  has a past medical history of Hypercholesteremia (11/6/2014) and Type II or unspecified type diabetes mellitus without mention of complication, not stated as uncontrolled (11/6/2014).    ROS  No chest pain  No SOB  No foot numbness, tingling, burning       Objective:     /70 (BP Location: Right arm, Patient Position: Sitting, BP Cuff Size: Adult)   Pulse 90   Temp 37 °C (98.6 °F)   Ht 1.651 m (5' 5\")   Wt 60.8 kg (134 lb)   SpO2 94%  Body mass index is 22.3 kg/m².     Physical Exam:  Constitutional: Alert, well-appearing, no distress.  Skin: Warm, dry, good turgor, no rashes in visible areas.  Eye: Pupils are equal and round, conjunctiva clear, lids normal.  ENMT: Lips without lesions, moist mucus membranes.  Neck: No masses. No submandibular or cervical lymphadenopathy.  Respiratory: Unlabored respiratory effort, lungs clear to auscultation, no wheezes, no rhonchi.  Cardiovascular: Normal S1, S2, no murmur, no lower extremity edema.    Monofilament testing with a 10 gram force: sensation intact: intact bilaterally  Visual Inspection: Feet without maceration, ulcers, fissures.  Pedal pulses: intact bilaterally        Assessment and Plan:   The following treatment plan was discussed    1. Type 2 diabetes mellitus without complication, without long-term current use of insulin (HCC)  Established problem, very well controlled.  A1c done today in the office is 6.0, which is actually lower than it was when she was on higher dose of metformin.  I have advised patient that she can discontinue metformin.  She knows to try to limit carbohydrates.  - POCT Hemoglobin A1C  - Diabetic Monofilament LE Exam    2. Hypertension associated with diabetes (HCC)  Established problem, well-controlled on 12.5 mg of losartan daily.  Unable to tolerate higher doses.  Blood pressure today in the office is " 122/70.  Continue current management.    3. Hypercholesteremia  Established problem, well-controlled on pravastatin 20 mg daily.  Last lipid profile from 2/2019 reviewed and normal.  Continue current management.    4. Eczema of both hands  Established problem, well-controlled with as needed steroid cream which I have refilled for her.  We discussed importance of using sparingly and maintaining with a good over-the-counter moisturizing lotion.  - fluocinonide (LIDEX) 0.05 % Cream; Apply bid to affected area as needed  Dispense: 60 g; Refill: 1    5. Need for vaccination  - INFLUENZA VACCINE, HIGH DOSE (65+ ONLY)  - Pneumococcal Conjugate Vaccine 13-Valent  - NON SPECIFIED; 0.5 mL by Intramuscular route Once for 1 dose.  Dispense: 1 Each; Refill: 1      Followup: Return in about 8 months (around 6/21/2020).    Emily Rowan P.A.-C.

## 2019-11-04 DIAGNOSIS — E11.59 HYPERTENSION ASSOCIATED WITH DIABETES (HCC): ICD-10-CM

## 2019-11-04 DIAGNOSIS — I15.2 HYPERTENSION ASSOCIATED WITH DIABETES (HCC): ICD-10-CM

## 2019-11-05 RX ORDER — LOSARTAN POTASSIUM 25 MG/1
TABLET ORAL
Qty: 100 TAB | Refills: 1 | Status: SHIPPED | OUTPATIENT
Start: 2019-11-05 | End: 2020-05-11 | Stop reason: SDUPTHER

## 2019-11-05 NOTE — TELEPHONE ENCOUNTER
Refill X 6 months, sent to pharmacy.Pt. Seen in the last 6 months per protocol.   Lab Results   Component Value Date/Time    SODIUM 141 08/19/2019 06:51 AM    POTASSIUM 4.2 08/19/2019 06:51 AM    CHLORIDE 105 08/19/2019 06:51 AM    CO2 28 08/19/2019 06:51 AM    GLUCOSE 89 08/19/2019 06:51 AM    BUN 14 08/19/2019 06:51 AM    CREATININE 0.81 08/19/2019 06:51 AM

## 2019-12-02 DIAGNOSIS — E78.00 HYPERCHOLESTEREMIA: ICD-10-CM

## 2019-12-03 RX ORDER — PRAVASTATIN SODIUM 20 MG
TABLET ORAL
Qty: 100 TAB | Refills: 1 | Status: SHIPPED | OUTPATIENT
Start: 2019-12-03 | End: 2020-06-16

## 2019-12-04 NOTE — TELEPHONE ENCOUNTER
Pt has had OV within the 12 month protocol and lipid panel is current. 6 month supply sent to pharmacy.   Lab Results   Component Value Date/Time    CHOLSTRLTOT 171 02/01/2019 06:10 AM    LDL 79 02/01/2019 06:10 AM    HDL 78 02/01/2019 06:10 AM    TRIGLYCERIDE 68 02/01/2019 06:10 AM       Lab Results   Component Value Date/Time    SODIUM 141 08/19/2019 06:51 AM    POTASSIUM 4.2 08/19/2019 06:51 AM    CHLORIDE 105 08/19/2019 06:51 AM    CO2 28 08/19/2019 06:51 AM    GLUCOSE 89 08/19/2019 06:51 AM    BUN 14 08/19/2019 06:51 AM    CREATININE 0.81 08/19/2019 06:51 AM     Lab Results   Component Value Date/Time    ALKPHOSPHAT 67 08/19/2019 06:51 AM    ASTSGOT 12 08/19/2019 06:51 AM    ALTSGPT 11 08/19/2019 06:51 AM    TBILIRUBIN 0.5 08/19/2019 06:51 AM

## 2020-05-08 ENCOUNTER — PATIENT OUTREACH (OUTPATIENT)
Dept: HEALTH INFORMATION MANAGEMENT | Facility: OTHER | Age: 75
End: 2020-05-08

## 2020-05-08 NOTE — PROGRESS NOTES
1. HealthConnect Verified: yes    2. Verify PCP: yes    3. Review and add  to Care Team: yes    5. Reviewed/Updated the following with patient:       •   Communication Preference Obtained? YES  • MyChart Activation: declined       •   E-Mail Address Obtained? YES       •   Appointment Day and Time Preferences? NO       •   Preferred Pharmacy? YES       •   Preferred Lab? YES    6. Care Gap Scheduling (Attempt to Schedule EACH Overdue Care Gap!)    Scheduled patient for Follow-Up for 8 month  and AHA     Notified Noemy SANCHEZ that intro has been completed and that pt needs her letter mailed. Pt had no questions or concerns at this time.

## 2020-05-08 NOTE — PROGRESS NOTES
Called member to introduce myself as their SCP  and to wish a Happy Birthday. No answer LM with call back number [phone]

## 2020-05-11 DIAGNOSIS — I15.2 HYPERTENSION ASSOCIATED WITH DIABETES (HCC): ICD-10-CM

## 2020-05-11 DIAGNOSIS — E11.59 HYPERTENSION ASSOCIATED WITH DIABETES (HCC): ICD-10-CM

## 2020-05-11 NOTE — TELEPHONE ENCOUNTER
Received request via: Patient    Was the patient seen in the last year in this department? Yes LOV 10/21/2019 Next Visit 06/24/2020    Does the patient have an active prescription (recently filled or refills available) for medication(s) requested? No

## 2020-05-13 RX ORDER — LOSARTAN POTASSIUM 25 MG/1
25 TABLET ORAL
Qty: 100 TAB | Refills: 0 | Status: SHIPPED | OUTPATIENT
Start: 2020-05-13 | End: 2020-06-24

## 2020-06-13 DIAGNOSIS — E78.00 HYPERCHOLESTEREMIA: ICD-10-CM

## 2020-06-16 RX ORDER — PRAVASTATIN SODIUM 20 MG
TABLET ORAL
Qty: 100 TAB | Refills: 0 | Status: SHIPPED | OUTPATIENT
Start: 2020-06-16 | End: 2020-06-24 | Stop reason: SDUPTHER

## 2020-06-16 NOTE — TELEPHONE ENCOUNTER
Last seen by PCP 10/21/2019. Will send 3 month(s) to the pharmacy.  Lab Results   Component Value Date/Time    CHOLSTRLTOT 171 02/01/2019 06:10 AM    LDL 79 02/01/2019 06:10 AM    HDL 78 02/01/2019 06:10 AM    TRIGLYCERIDE 68 02/01/2019 06:10 AM       Lab Results   Component Value Date/Time    SODIUM 141 08/19/2019 06:51 AM    POTASSIUM 4.2 08/19/2019 06:51 AM    CHLORIDE 105 08/19/2019 06:51 AM    CO2 28 08/19/2019 06:51 AM    GLUCOSE 89 08/19/2019 06:51 AM    BUN 14 08/19/2019 06:51 AM    CREATININE 0.81 08/19/2019 06:51 AM     Lab Results   Component Value Date/Time    ALKPHOSPHAT 67 08/19/2019 06:51 AM    ASTSGOT 12 08/19/2019 06:51 AM    ALTSGPT 11 08/19/2019 06:51 AM    TBILIRUBIN 0.5 08/19/2019 06:51 AM      Due for lipid labs

## 2020-06-24 ENCOUNTER — OFFICE VISIT (OUTPATIENT)
Dept: MEDICAL GROUP | Facility: PHYSICIAN GROUP | Age: 75
End: 2020-06-24
Payer: MEDICARE

## 2020-06-24 VITALS
HEIGHT: 65 IN | BODY MASS INDEX: 22.66 KG/M2 | TEMPERATURE: 98.5 F | SYSTOLIC BLOOD PRESSURE: 124 MMHG | HEART RATE: 84 BPM | OXYGEN SATURATION: 95 % | WEIGHT: 136 LBS | DIASTOLIC BLOOD PRESSURE: 60 MMHG

## 2020-06-24 DIAGNOSIS — Z78.0 POSTMENOPAUSAL: ICD-10-CM

## 2020-06-24 DIAGNOSIS — E11.59 HYPERTENSION ASSOCIATED WITH DIABETES (HCC): ICD-10-CM

## 2020-06-24 DIAGNOSIS — Z12.31 ENCOUNTER FOR SCREENING MAMMOGRAM FOR MALIGNANT NEOPLASM OF BREAST: ICD-10-CM

## 2020-06-24 DIAGNOSIS — E11.69 TYPE 2 DIABETES MELLITUS WITH OTHER SPECIFIED COMPLICATION, WITHOUT LONG-TERM CURRENT USE OF INSULIN (HCC): ICD-10-CM

## 2020-06-24 DIAGNOSIS — L60.9 NAIL PROBLEM: ICD-10-CM

## 2020-06-24 DIAGNOSIS — I15.2 HYPERTENSION ASSOCIATED WITH DIABETES (HCC): ICD-10-CM

## 2020-06-24 DIAGNOSIS — E78.00 HYPERCHOLESTEREMIA: ICD-10-CM

## 2020-06-24 PROCEDURE — 99214 OFFICE O/P EST MOD 30 MIN: CPT | Performed by: PHYSICIAN ASSISTANT

## 2020-06-24 RX ORDER — PRAVASTATIN SODIUM 20 MG
20 TABLET ORAL
Qty: 100 TAB | Refills: 3 | Status: SHIPPED | OUTPATIENT
Start: 2020-06-24 | End: 2021-08-03 | Stop reason: SDUPTHER

## 2020-06-24 RX ORDER — LOSARTAN POTASSIUM 25 MG/1
12.5 TABLET ORAL
Qty: 100 TAB | Refills: 0
Start: 2020-06-24 | End: 2021-06-23 | Stop reason: SDUPTHER

## 2020-06-24 ASSESSMENT — PATIENT HEALTH QUESTIONNAIRE - PHQ9: CLINICAL INTERPRETATION OF PHQ2 SCORE: 0

## 2020-06-24 NOTE — PATIENT INSTRUCTIONS
The 3 providers available to establish care with at this clinic are:  1. Dr. David Hernandez MD  2. Dr. Marci Wei DO  3. MARIANGEL Arora

## 2020-06-24 NOTE — PROGRESS NOTES
"Subjective:   Alejandra Martinez is a 75 y.o. female here today for follow-up on diabetes. Is an established patient of mine.    HPI:    Patient presents to the office today for routine follow-up on her type 2 diabetes and other medical problems.  Last visit with me was in October 2019. No significant health changes since last office visit. She does mention that over the last 6-12 months she's noticed some cracking of her fingernails and left big toenail changes and wonders if she may be deficient in something.    Patient has type 2 diabetes. At last appointment, I discontinued her metformin, as her A1c has been very low.  Not currently on any medications for her diabetes. She does admit to poor diet lately. She does continue with losartan 12.5 mg daily (cuts 25 mg tablet in half) for hypertension and pravastatin 20 mg daily for hyperlipidemia.      Current medicines (including changes today)  Current Outpatient Medications   Medication Sig Dispense Refill   • losartan (COZAAR) 25 MG Tab Take 0.5 Tabs by mouth every day. 100 Tab 0   • pravastatin (PRAVACHOL) 20 MG Tab Take 1 Tab by mouth every day. 100 Tab 3   • fluocinonide (LIDEX) 0.05 % Cream Apply bid to affected area as needed 60 g 1   • Oxyquinoline-Sod Lauryl Sulf 0.025-0.01 % Gel Insert 1 Application in vagina at bedtime as needed. 1 Tube 11   • conjugated estrogen (PREMARIN) 0.625 MG/GM Cream Insert 0.5 g in vagina every bedtime. (Patient not taking: Reported on 6/24/2020) 1 Tube 6     No current facility-administered medications for this visit.      She  has a past medical history of Hypercholesteremia (11/6/2014) and Type II or unspecified type diabetes mellitus without mention of complication, not stated as uncontrolled (11/6/2014).    ROS  No chest pain, edema  No shortness of breath  No dizziness/lightheadedness       Objective:     /60   Pulse 84   Temp 36.9 °C (98.5 °F)   Ht 1.651 m (5' 5\")   Wt 61.7 kg (136 lb)   SpO2 95%  Body mass " index is 22.63 kg/m².     Physical Exam:  Constitutional: Alert, well-appearing, very pleasant, no distress.  Skin: Warm, dry, good turgor, no rashes in visible areas. Fingernails appear intact, no obvious cracking noted. There is a small white discoloration on the medial aspect of the left great toenail.  Eye: Pupils are equal and round, conjunctiva clear, lids normal.  ENMT: Lips without lesions, moist mucus membranes.  Neck: Normal-appearing.  Respiratory: Unlabored respiratory effort, lungs clear to auscultation, no wheezes, no rhonchi.  Cardiovascular: Normal S1, S2, no murmur, no lower extremity edema.      Assessment and Plan:   The following treatment plan was discussed    1. Type 2 diabetes mellitus with other specified complication, without long-term current use of insulin (HCC)  Established problem, suspect slight worsening with d/c of metformin and poor diet but hopefully A1c still at goal. Will re-check along with other screening lab work. In the meantime, should work on dietary improvement, specifically low-carb, low-sugar.  - HEMOGLOBIN A1C; Future  - MICROALBUMIN CREAT RATIO URINE; Future  - Lipid Profile; Future  - Comp Metabolic Panel; Future    2. Hypercholesteremia  Established problem, suspect well-controlled with pravastatin. She is due for repeat lipid profile. In the meantime, continue current management.  - pravastatin (PRAVACHOL) 20 MG Tab; Take 1 Tab by mouth every day.  Dispense: 100 Tab; Refill: 3    3. Hypertension associated with diabetes (HCC)  Established problem, BP well-controlled on very low dose of losartan. Continue current management.  - losartan (COZAAR) 25 MG Tab; Take 0.5 Tabs by mouth every day.  Dispense: 100 Tab; Refill: 0    4. Nail problem  New problem, uncontrolled. Having some mild nail changes. I have recommended that she start calcium/vitamin D supplementation and can try OTC collagen and/or Hair/skin/nail supplement as well. Follow up if no improvement.    5.  Postmenopausal  - DS-BONE DENSITY STUDY (DEXA); Future    6. Encounter for screening mammogram for malignant neoplasm of breast  - MA-SCREENING MAMMO BILAT W/CAD; Future    Had discussion with patient regarding recommended health maintenance items for her age.  She is agreeable with completing mammogram and DEXA scan.  She is due for hepatitis B, Tdap, and zoster vaccinations, but declines all at present.  May be amenable to completing in the future.  Finally, she is due for retinal eye exam.  She is already established with an eye clinic.  Once she schedules appointment, advised to make sure they send us a copy of the record.      Followup: Return in about 6 months (around 12/24/2020) for f/u DM, establish care with new PCP.    Emily Rowan P.A.-C.

## 2020-06-29 ENCOUNTER — HOSPITAL ENCOUNTER (OUTPATIENT)
Dept: LAB | Facility: MEDICAL CENTER | Age: 75
End: 2020-06-29
Attending: PHYSICIAN ASSISTANT
Payer: MEDICARE

## 2020-06-29 DIAGNOSIS — E11.69 TYPE 2 DIABETES MELLITUS WITH OTHER SPECIFIED COMPLICATION, WITHOUT LONG-TERM CURRENT USE OF INSULIN (HCC): ICD-10-CM

## 2020-06-29 LAB
ALBUMIN SERPL BCP-MCNC: 4 G/DL (ref 3.2–4.9)
ALBUMIN/GLOB SERPL: 1.7 G/DL
ALP SERPL-CCNC: 73 U/L (ref 30–99)
ALT SERPL-CCNC: 15 U/L (ref 2–50)
ANION GAP SERPL CALC-SCNC: 10 MMOL/L (ref 7–16)
AST SERPL-CCNC: 14 U/L (ref 12–45)
BILIRUB SERPL-MCNC: 0.3 MG/DL (ref 0.1–1.5)
BUN SERPL-MCNC: 11 MG/DL (ref 8–22)
CALCIUM SERPL-MCNC: 9.2 MG/DL (ref 8.5–10.5)
CHLORIDE SERPL-SCNC: 103 MMOL/L (ref 96–112)
CHOLEST SERPL-MCNC: 186 MG/DL (ref 100–199)
CO2 SERPL-SCNC: 26 MMOL/L (ref 20–33)
CREAT SERPL-MCNC: 0.77 MG/DL (ref 0.5–1.4)
CREAT UR-MCNC: 43.03 MG/DL
EST. AVERAGE GLUCOSE BLD GHB EST-MCNC: 140 MG/DL
FASTING STATUS PATIENT QL REPORTED: NORMAL
GLOBULIN SER CALC-MCNC: 2.3 G/DL (ref 1.9–3.5)
GLUCOSE SERPL-MCNC: 103 MG/DL (ref 65–99)
HBA1C MFR BLD: 6.5 % (ref 0–5.6)
HDLC SERPL-MCNC: 76 MG/DL
LDLC SERPL CALC-MCNC: 98 MG/DL
MICROALBUMIN UR-MCNC: 1.7 MG/DL
MICROALBUMIN/CREAT UR: 40 MG/G (ref 0–30)
POTASSIUM SERPL-SCNC: 4.2 MMOL/L (ref 3.6–5.5)
PROT SERPL-MCNC: 6.3 G/DL (ref 6–8.2)
SODIUM SERPL-SCNC: 139 MMOL/L (ref 135–145)
TRIGL SERPL-MCNC: 58 MG/DL (ref 0–149)

## 2020-06-29 PROCEDURE — 82570 ASSAY OF URINE CREATININE: CPT

## 2020-06-29 PROCEDURE — 82043 UR ALBUMIN QUANTITATIVE: CPT

## 2020-06-29 PROCEDURE — 80061 LIPID PANEL: CPT

## 2020-06-29 PROCEDURE — 36415 COLL VENOUS BLD VENIPUNCTURE: CPT

## 2020-06-29 PROCEDURE — 80053 COMPREHEN METABOLIC PANEL: CPT

## 2020-06-29 PROCEDURE — 83036 HEMOGLOBIN GLYCOSYLATED A1C: CPT

## 2020-07-01 ENCOUNTER — TELEPHONE (OUTPATIENT)
Dept: MEDICAL GROUP | Facility: PHYSICIAN GROUP | Age: 75
End: 2020-07-01

## 2020-07-01 NOTE — TELEPHONE ENCOUNTER
----- Message from Emily Rowan P.A.-C. sent at 7/1/2020  1:43 PM PDT -----  Please inform patient that on review of her lab work, A1c has gone up a little bit to 6.5 but is still at-goal.  She is leaking a little bit of protein into her urine which indicates early kidney disease. This can be improved with improved blood sugar control and making sure she continues to take her losartan which will help to protect against further kidney disease. Other lab work is normal including electrolytes, liver function, and cholesterol.  Emily Rowan P.A.-C.

## 2020-07-02 ENCOUNTER — PATIENT OUTREACH (OUTPATIENT)
Dept: HEALTH INFORMATION MANAGEMENT | Facility: OTHER | Age: 75
End: 2020-07-02

## 2020-07-02 NOTE — TELEPHONE ENCOUNTER
Patient notified of results. Patient would like to know if she should continue taking half a pill for losartan.    Please advise.

## 2020-07-02 NOTE — PROGRESS NOTES
Mbr set to est with Dr. Sweeney in August 1. HealthConnect Verified: yes    2. Verify PCP: yes    3. Review and add  to Care Team: yes    4. Reviewed/Updated the following with patient:       •   Communication Preference Obtained? YES  • MyChart Activation: sent activation code       •   E-Mail Address Obtained? YES       •   Appointment Day and Time Preferences? YES       •   Preferred Pharmacy? YES       •   Preferred Lab? YES    5. Care Gap Scheduling (Attempt to Schedule EACH Overdue Care Gap!)    Patient prefers to discuss Annual Wellness Visit (AWV) and Immunizations: HEPATITIS B, TDAP and SHINGRIX (Shingles) with PCP.

## 2020-07-06 NOTE — TELEPHONE ENCOUNTER
Would normally recommend a higher dose to help with the protein in the urine, but I know that she has been unable to tolerate the full 25 mg tablet in the past. Recommend she continue cutting in half, which should still provide some benefit.  Emily Rowan P.A.-C.

## 2020-07-14 DIAGNOSIS — L30.9 ECZEMA OF BOTH HANDS: ICD-10-CM

## 2020-08-17 ENCOUNTER — OFFICE VISIT (OUTPATIENT)
Dept: MEDICAL GROUP | Facility: PHYSICIAN GROUP | Age: 75
End: 2020-08-17
Payer: MEDICARE

## 2020-08-17 VITALS
HEIGHT: 65 IN | WEIGHT: 138 LBS | RESPIRATION RATE: 16 BRPM | BODY MASS INDEX: 22.99 KG/M2 | OXYGEN SATURATION: 97 % | TEMPERATURE: 97.5 F | DIASTOLIC BLOOD PRESSURE: 50 MMHG | SYSTOLIC BLOOD PRESSURE: 122 MMHG | HEART RATE: 89 BPM

## 2020-08-17 DIAGNOSIS — E11.29 TYPE 2 DIABETES MELLITUS WITH MICROALBUMINURIA, WITHOUT LONG-TERM CURRENT USE OF INSULIN (HCC): ICD-10-CM

## 2020-08-17 DIAGNOSIS — I15.2 HYPERTENSION ASSOCIATED WITH DIABETES (HCC): ICD-10-CM

## 2020-08-17 DIAGNOSIS — E11.59 HYPERTENSION ASSOCIATED WITH DIABETES (HCC): ICD-10-CM

## 2020-08-17 DIAGNOSIS — E78.00 HYPERCHOLESTEREMIA: ICD-10-CM

## 2020-08-17 DIAGNOSIS — R80.9 TYPE 2 DIABETES MELLITUS WITH MICROALBUMINURIA, WITHOUT LONG-TERM CURRENT USE OF INSULIN (HCC): ICD-10-CM

## 2020-08-17 PROCEDURE — 99213 OFFICE O/P EST LOW 20 MIN: CPT | Performed by: FAMILY MEDICINE

## 2020-08-17 NOTE — PROGRESS NOTES
Alejandra Martinez is a 75 y.o. female here to establish care and discuss diabetes and lab resutls.    HPI:  Alejandra is a pleasant 75-year-old female here to establish care.  Her prior PCP was Emily Rowan PA-C, who will be moving out of the area.  She is the wife of one of my patients (Yonny).  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    In regards to her diabetes, this is very stable.  Her A1c did increase a bit at 6.5%.  She is off medication at this time.  She does admit that she has been eating more eggs, rodriguez and hashbrowns than she used to with her new .  Her LDL increased by about 20 points.  Overall, she feels well.  She does have questions about the protein in her urine.    She is scheduling an appointment to meet with her optometrist for her eye exam.    Current medicines (including changes today)  Current Outpatient Medications   Medication Sig Dispense Refill   • losartan (COZAAR) 25 MG Tab Take 0.5 Tabs by mouth every day. 100 Tab 0   • pravastatin (PRAVACHOL) 20 MG Tab Take 1 Tab by mouth every day. 100 Tab 3     No current facility-administered medications for this visit.      She  has a past medical history of Hypercholesteremia (11/6/2014) and Type II or unspecified type diabetes mellitus without mention of complication, not stated as uncontrolled (11/6/2014).  She  has a past surgical history that includes appendectomy.  Social History     Tobacco Use   • Smoking status: Never Smoker   • Smokeless tobacco: Never Used   • Tobacco comment: avoid all tobacco products   Substance Use Topics   • Alcohol use: No     Alcohol/week: 0.0 oz     Comment: little to none   • Drug use: No     Social History     Social History Narrative   • Not on file     Family History   Problem Relation Age of Onset   • Cancer Mother         breast   • Hyperlipidemia Mother    • Cancer Father         bladder   • Genetic Disorder Father         Parkinson's   • No  "Known Problems Sister    • No Known Problems Brother    • Cancer Paternal Aunt         breast   • Diabetes Neg Hx    • Stroke Neg Hx    • Lung Disease Neg Hx    • Heart Disease Neg Hx    • Hypertension Neg Hx      Family Status   Relation Name Status   • Mo   at age 80's   • Fa   at age 80's   • Sis  Alive   • Bro x2 Alive   • PAunt  (Not Specified)   • Neg Hx  (Not Specified)     ROS  Constitutional: Negative for fever, chills and malaise/fatigue.   HENT: Negative for congestion.    Eyes: Negative for pain.   Respiratory: Negative for cough and shortness of breath.    Cardiovascular: Negative for leg swelling.   Gastrointestinal: Negative for nausea, vomiting, abdominal pain and diarrhea.   Genitourinary: Negative for dysuria and hematuria.   Skin: Negative for rash.   Neurological: Negative for dizziness, focal weakness and headaches.   Endo/Heme/Allergies: Does not bruise/bleed easily.   Psychiatric/Behavioral: Negative for depression.  The patient is not nervous/anxious.       Objective:     Physical Exam:  /50   Pulse 89   Temp 36.4 °C (97.5 °F)   Resp 16   Ht 1.651 m (5' 5\")   Wt 62.6 kg (138 lb)   SpO2 97%  Body mass index is 22.96 kg/m².  Constitutional: Alert, no distress.  Skin: Warm, dry, good turgor, no rashes in visible areas.  Neck: Trachea midline, no masses, no thyromegaly.  Respiratory: Unlabored respiratory effort, lungs clear to auscultation, no wheezes, no ronchi.  Cardiovascular: Normal S1, S2, no murmur, no edema.  Abdomen: Soft, non-tender, no masses, no hepatosplenomegaly.  Psych: Alert and oriented x3, normal affect and mood.    Assessment and Plan:     1. Type 2 diabetes mellitus with microalbuminuria, without long-term current use of insulin (HCC)  Chronic and well-controlled.  Continue to manage with diet.  Will recheck A1c in the office at her next appointment.  We did discuss increasing her water intake given the microalbuminuria.  She is already " appropriately on an ARB.    2. Hypertension associated with diabetes (HCC)  Well-controlled on current regimen.  Labs as indicated.  Continue antihypertensive medications.  Discussed decreasing salt intake.  Emphasized benefits of exercise and diet. Continue to monitor.    3. Hypercholesteremia  LDL did increase from last year.  Appears to be related to some diet changes.  Encouraged her to have oatmeal for breakfast some days to help lower this.  We will hold off on statin dose change for now.    Records reviewed in Epic.  Followup: Return in about 7 months (around 3/17/2021) for Diabetes, get in office A1c, short.         PLEASE NOTE: This dictation was created using voice recognition software. I have made every reasonable attempt to correct obvious errors, but I expect that there are errors of grammar and possibly content that I did not discover before finalizing the note.

## 2020-10-23 ENCOUNTER — NON-PROVIDER VISIT (OUTPATIENT)
Dept: MEDICAL GROUP | Facility: PHYSICIAN GROUP | Age: 75
End: 2020-10-23
Payer: MEDICARE

## 2020-10-23 DIAGNOSIS — Z23 NEED FOR IMMUNIZATION AGAINST INFLUENZA: ICD-10-CM

## 2020-10-23 PROCEDURE — G0008 ADMIN INFLUENZA VIRUS VAC: HCPCS | Performed by: FAMILY MEDICINE

## 2020-10-23 PROCEDURE — 90662 IIV NO PRSV INCREASED AG IM: CPT | Performed by: FAMILY MEDICINE

## 2020-10-23 NOTE — PROGRESS NOTES
"Alejanrda Martinez is a 75 y.o. female here for a non-provider visit for:   FLU    Reason for immunization: Annual Flu Vaccine  Immunization records indicate need for vaccine: Yes, confirmed with Epic  Minimum interval has been met for this vaccine: Yes  ABN completed: Yes    Order and dose verified by: Magdi Arredondo  VIS Dated  8/15/2019 was given to patient: Yes  All IAC Questionnaire questions were answered \"No.\"    Patient tolerated injection and no adverse effects were observed or reported: Yes    Pt scheduled for next dose in series: Not Indicated    "

## 2021-01-11 ENCOUNTER — TELEPHONE (OUTPATIENT)
Dept: OBGYN | Facility: CLINIC | Age: 76
End: 2021-01-11

## 2021-01-11 DIAGNOSIS — Z23 NEED FOR VACCINATION: ICD-10-CM

## 2021-01-11 NOTE — TELEPHONE ENCOUNTER
Pt LM on VM stating she had questions about her pessary and requesting a call back. Called pt back, unable to reach her, AZALIAB

## 2021-01-13 ENCOUNTER — TELEPHONE (OUTPATIENT)
Dept: OBGYN | Facility: CLINIC | Age: 76
End: 2021-01-13

## 2021-01-13 ENCOUNTER — GYNECOLOGY VISIT (OUTPATIENT)
Dept: OBGYN | Facility: CLINIC | Age: 76
End: 2021-01-13
Payer: MEDICARE

## 2021-01-13 VITALS — DIASTOLIC BLOOD PRESSURE: 59 MMHG | BODY MASS INDEX: 23.13 KG/M2 | WEIGHT: 139 LBS | SYSTOLIC BLOOD PRESSURE: 126 MMHG

## 2021-01-13 DIAGNOSIS — Z46.89 PESSARY MAINTENANCE: ICD-10-CM

## 2021-01-13 PROCEDURE — 99212 OFFICE O/P EST SF 10 MIN: CPT | Performed by: OBSTETRICS & GYNECOLOGY

## 2021-01-13 RX ORDER — CONJUGATED ESTROGENS 0.62 MG/G
CREAM VAGINAL
Qty: 30 G | Refills: 6 | Status: SHIPPED | OUTPATIENT
Start: 2021-01-13 | End: 2022-11-07 | Stop reason: SDUPTHER

## 2021-01-13 NOTE — NON-PROVIDER
Patient here today for GYN visit.  Here today for Pessary cleaning  Has not had a cleaning since 2019  Patient states that she had some bloody discharge on Sunday.  Phone # [phone]  Pharmacy verified.

## 2021-01-13 NOTE — TELEPHONE ENCOUNTER
Patient called in stating that  wanted her to restart her Premarin cream, patient states that the cream that she has at home is . Told the patient that I will have  refill her Rx for premarin. No further questions.

## 2021-01-14 ENCOUNTER — TELEPHONE (OUTPATIENT)
Dept: MEDICAL GROUP | Facility: PHYSICIAN GROUP | Age: 76
End: 2021-01-14

## 2021-01-14 ENCOUNTER — HOSPITAL ENCOUNTER (OUTPATIENT)
Dept: RADIOLOGY | Facility: MEDICAL CENTER | Age: 76
End: 2021-01-14
Attending: PHYSICIAN ASSISTANT
Payer: MEDICARE

## 2021-01-14 DIAGNOSIS — Z12.31 ENCOUNTER FOR SCREENING MAMMOGRAM FOR MALIGNANT NEOPLASM OF BREAST: ICD-10-CM

## 2021-01-14 PROCEDURE — 77063 BREAST TOMOSYNTHESIS BI: CPT

## 2021-01-14 NOTE — TELEPHONE ENCOUNTER
----- Message from Magdi Arredondo R.N. sent at 1/14/2021  2:42 PM PST -----    ----- Message -----  From: Flaco Nuno M.D.  Sent: 1/14/2021   2:39 PM PST  To: [facility] Pleitez Ma    Mammogram results are negative for malignancy.  Due for repeat in 1 year    Thank you,    Dr. DARLENE Nuno  Family Medicine Physician  [facility] [place]  [phone]

## 2021-01-14 NOTE — TELEPHONE ENCOUNTER
Phone Number Called: [phone] (home)     Call outcome: Left detailed message for patient. Informed to call back with any additional questions.    Message: Pt notified of results. Pt informed to call office back with questions or concerns.

## 2021-01-22 ENCOUNTER — OFFICE VISIT (OUTPATIENT)
Dept: MEDICAL GROUP | Facility: PHYSICIAN GROUP | Age: 76
End: 2021-01-22
Payer: MEDICARE

## 2021-01-22 VITALS
BODY MASS INDEX: 23.19 KG/M2 | DIASTOLIC BLOOD PRESSURE: 72 MMHG | HEIGHT: 65 IN | HEART RATE: 90 BPM | OXYGEN SATURATION: 96 % | SYSTOLIC BLOOD PRESSURE: 126 MMHG | RESPIRATION RATE: 16 BRPM | TEMPERATURE: 97.7 F | WEIGHT: 139.2 LBS

## 2021-01-22 DIAGNOSIS — R80.9 TYPE 2 DIABETES MELLITUS WITH MICROALBUMINURIA, WITHOUT LONG-TERM CURRENT USE OF INSULIN (HCC): ICD-10-CM

## 2021-01-22 DIAGNOSIS — E11.59 HYPERTENSION ASSOCIATED WITH DIABETES (HCC): ICD-10-CM

## 2021-01-22 DIAGNOSIS — I15.2 HYPERTENSION ASSOCIATED WITH DIABETES (HCC): ICD-10-CM

## 2021-01-22 DIAGNOSIS — R19.7 OVERFLOW DIARRHEA: ICD-10-CM

## 2021-01-22 DIAGNOSIS — E11.29 TYPE 2 DIABETES MELLITUS WITH MICROALBUMINURIA, WITHOUT LONG-TERM CURRENT USE OF INSULIN (HCC): ICD-10-CM

## 2021-01-22 PROCEDURE — 8041 PR SCP AHA: Performed by: FAMILY MEDICINE

## 2021-01-22 PROCEDURE — 99214 OFFICE O/P EST MOD 30 MIN: CPT | Performed by: FAMILY MEDICINE

## 2021-01-22 NOTE — ASSESSMENT & PLAN NOTE
Stable. Monitoring BP at home. Currently taking losartan 12.5mg as directed.  Denies lightheadedness, vision changes, headache, palpitations or leg swelling.

## 2021-01-22 NOTE — NON-PROVIDER
Annual Health Assessment Questions:    1.  Are you currently engaging in any exercise or physical activity? No    2.  How would you describe your mood or emotional well-being today? good    3.  Have you had any falls in the last year? No    4.  Have you noticed any problems with your balance or had difficulty walking? No    5.  In the last six months have you experienced any leakage of urine? Yes    6. DPA/Advanced Directive: Patient has Advanced Directive on file.

## 2021-01-22 NOTE — PROGRESS NOTES
"Subjective:     Chief Complaint   Patient presents with   • Follow-Up   • GI Problem     'years'       HPI:   Alejandra presents today to discuss the following.      Type 2 diabetes mellitus with microalbuminuria, without long-term current use of insulin (HCC)  This is a chronic condition.  The patient carries a history of type 2 diabetes with an A1c last checked June 2020 at 6.5.  Currently not taking any medications for this.  She is due for repeat labs 6/21 with PCP    Overflow diarrhea  This is a chronic problem  Has been happening for many years  She only has one single BM per week  Then she has diarrhea. She feels like \"things build up\"  No current treatment.     Hypertension associated with diabetes (HCC)  Stable. Monitoring BP at home. Currently taking losartan 12.5mg as directed.  Denies lightheadedness, vision changes, headache, palpitations or leg swelling.        Past Medical History:   Diagnosis Date   • Hypercholesteremia 11/6/2014   • Type II or unspecified type diabetes mellitus without mention of complication, not stated as uncontrolled 11/6/2014       Social History     Tobacco Use   • Smoking status: Never Smoker   • Smokeless tobacco: Never Used   • Tobacco comment: avoid all tobacco products   Substance Use Topics   • Alcohol use: No     Alcohol/week: 0.0 oz     Comment: little to none   • Drug use: No       Current Outpatient Medications Ordered in Epic   Medication Sig Dispense Refill   • estrogens, conjugated (PREMARIN) 0.625 MG/GM Cream Apply 0.5mg per vagina nightly for 2 weeks then decrease to 3 times weekly 30 g 6   • losartan (COZAAR) 25 MG Tab Take 0.5 Tabs by mouth every day. 100 Tab 0   • pravastatin (PRAVACHOL) 20 MG Tab Take 1 Tab by mouth every day. 100 Tab 3     No current Epic-ordered facility-administered medications on file.        Allergies:  Patient has no known allergies.    Health Maintenance: Completed    ROS:  Gen: no fevers/chills, no changes in weight  Eyes: no changes in " "vision  ENT: no sore throat, no hearing loss, no bloody nose  Pulm: no sob, no cough  CV: no chest pain, no palpitations  GI: no nausea/vomiting, no diarrhea      Objective:     Exam:  /72 (BP Location: Left arm, Patient Position: Sitting, BP Cuff Size: Adult)   Pulse 90   Temp 36.5 °C (97.7 °F) (Temporal)   Resp 16   Ht 1.651 m (5' 5\")   Wt 63.1 kg (139 lb 3.2 oz)   LMP  (LMP Unknown)   SpO2 96%   BMI 23.16 kg/m²  Body mass index is 23.16 kg/m².    Constitutional: Alert, no distress, well-groomed.  Skin: Warm, dry, good turgor, no rashes in visible areas.  Eye: Equal, round and reactive, conjunctiva clear, lids normal.  ENMT: Lips without lesions, good dentition, moist mucous membranes.  Neck: Trachea midline, no masses, no thyromegaly.  Respiratory: Unlabored respiratory effort, no cough.  MSK: Normal gait, moves all extremities.  Neuro: Grossly non-focal.   Psych: Alert and oriented x3, normal affect and mood.      Assessment & Plan:     75 y.o. female with the following -     1. Type 2 diabetes mellitus with microalbuminuria, without long-term current use of insulin (HCC)  This is a chronic, stable condition  Stable with lifestyle change alone.  Most recent A1c was June 2020 at 6.5.  Previously on Metformin  Plan on repeating labs by June 2021 with PCP  On statin therapy and on ARB therapy  Blood pressure at goal less than 140/90    2. Overflow diarrhea  This is a chronic, unchanged condition.  Longstanding history of overflow diarrhea.  I recommend IBgard along with Metamucil  She will follow up with PCP for clinical outcome and further recommendations    3. Hypertension associated with diabetes (HCC)  This is a chronic, stable condition.  Continue with current regimen.      Return in about 3 months (around 4/22/2021).    Please note that this dictation was created using voice recognition software. I have made every reasonable attempt to correct obvious errors, but I expect that there are errors " of grammar and possibly content that I did not discover before finalizing the note.

## 2021-01-22 NOTE — ASSESSMENT & PLAN NOTE
"This is a chronic problem  Has been happening for many years  She only has one single BM per week  Then she has diarrhea. She feels like \"things build up\"  No current treatment.   "

## 2021-01-27 ENCOUNTER — APPOINTMENT (OUTPATIENT)
Dept: FAMILY PLANNING/WOMEN'S HEALTH CLINIC | Facility: IMMUNIZATION CENTER | Age: 76
End: 2021-01-27
Attending: INTERNAL MEDICINE
Payer: MEDICARE

## 2021-01-27 DIAGNOSIS — Z23 NEED FOR VACCINATION: ICD-10-CM

## 2021-01-27 PROCEDURE — 0011A MODERNA SARS-COV-2 VACCINE: CPT

## 2021-01-27 PROCEDURE — 91301 MODERNA SARS-COV-2 VACCINE: CPT

## 2021-01-28 ENCOUNTER — IMMUNIZATION (OUTPATIENT)
Dept: FAMILY PLANNING/WOMEN'S HEALTH CLINIC | Facility: IMMUNIZATION CENTER | Age: 76
End: 2021-01-28
Payer: MEDICARE

## 2021-01-28 DIAGNOSIS — Z23 ENCOUNTER FOR VACCINATION: Primary | ICD-10-CM

## 2021-02-02 ENCOUNTER — PATIENT MESSAGE (OUTPATIENT)
Dept: MEDICAL GROUP | Facility: PHYSICIAN GROUP | Age: 76
End: 2021-02-02

## 2021-02-02 DIAGNOSIS — Z65.8 INTERPERSONAL PROBLEM: ICD-10-CM

## 2021-02-22 ENCOUNTER — PATIENT MESSAGE (OUTPATIENT)
Dept: MEDICAL GROUP | Facility: PHYSICIAN GROUP | Age: 76
End: 2021-02-22

## 2021-02-22 DIAGNOSIS — Z65.8 INTERPERSONAL PROBLEM: ICD-10-CM

## 2021-02-23 NOTE — TELEPHONE ENCOUNTER
Caleb Shelley, I dont recall at all talking to her about this. Maybe she is confusing me with a different provider.  Maybe resending the referral asking for an outside facility for a sooner appt?     Thanks!  Jose

## 2021-03-05 ENCOUNTER — IMMUNIZATION (OUTPATIENT)
Dept: FAMILY PLANNING/WOMEN'S HEALTH CLINIC | Facility: IMMUNIZATION CENTER | Age: 76
End: 2021-03-05
Attending: INTERNAL MEDICINE
Payer: MEDICARE

## 2021-03-05 DIAGNOSIS — Z23 ENCOUNTER FOR VACCINATION: Primary | ICD-10-CM

## 2021-03-05 PROCEDURE — 0012A MODERNA SARS-COV-2 VACCINE: CPT | Performed by: INTERNAL MEDICINE

## 2021-03-05 PROCEDURE — 91301 MODERNA SARS-COV-2 VACCINE: CPT | Performed by: INTERNAL MEDICINE

## 2021-03-10 ENCOUNTER — PATIENT MESSAGE (OUTPATIENT)
Dept: MEDICAL GROUP | Facility: PHYSICIAN GROUP | Age: 76
End: 2021-03-10

## 2021-03-10 DIAGNOSIS — E11.29 TYPE 2 DIABETES MELLITUS WITH MICROALBUMINURIA, WITHOUT LONG-TERM CURRENT USE OF INSULIN (HCC): ICD-10-CM

## 2021-03-10 DIAGNOSIS — R80.9 TYPE 2 DIABETES MELLITUS WITH MICROALBUMINURIA, WITHOUT LONG-TERM CURRENT USE OF INSULIN (HCC): ICD-10-CM

## 2021-03-11 ENCOUNTER — HOSPITAL ENCOUNTER (OUTPATIENT)
Dept: LAB | Facility: MEDICAL CENTER | Age: 76
End: 2021-03-11
Attending: FAMILY MEDICINE
Payer: MEDICARE

## 2021-03-11 DIAGNOSIS — R80.9 TYPE 2 DIABETES MELLITUS WITH MICROALBUMINURIA, WITHOUT LONG-TERM CURRENT USE OF INSULIN (HCC): ICD-10-CM

## 2021-03-11 DIAGNOSIS — E11.29 TYPE 2 DIABETES MELLITUS WITH MICROALBUMINURIA, WITHOUT LONG-TERM CURRENT USE OF INSULIN (HCC): ICD-10-CM

## 2021-03-11 LAB
ALBUMIN SERPL BCP-MCNC: 3.9 G/DL (ref 3.2–4.9)
ALBUMIN/GLOB SERPL: 1.4 G/DL
ALP SERPL-CCNC: 93 U/L (ref 30–99)
ALT SERPL-CCNC: 20 U/L (ref 2–50)
ANION GAP SERPL CALC-SCNC: 11 MMOL/L (ref 7–16)
AST SERPL-CCNC: 17 U/L (ref 12–45)
BILIRUB SERPL-MCNC: 0.4 MG/DL (ref 0.1–1.5)
BUN SERPL-MCNC: 13 MG/DL (ref 8–22)
CALCIUM SERPL-MCNC: 9 MG/DL (ref 8.5–10.5)
CHLORIDE SERPL-SCNC: 100 MMOL/L (ref 96–112)
CHOLEST SERPL-MCNC: 213 MG/DL (ref 100–199)
CO2 SERPL-SCNC: 26 MMOL/L (ref 20–33)
CREAT SERPL-MCNC: 0.82 MG/DL (ref 0.5–1.4)
CREAT UR-MCNC: 60.32 MG/DL
EST. AVERAGE GLUCOSE BLD GHB EST-MCNC: 137 MG/DL
FASTING STATUS PATIENT QL REPORTED: NORMAL
GLOBULIN SER CALC-MCNC: 2.8 G/DL (ref 1.9–3.5)
GLUCOSE SERPL-MCNC: 94 MG/DL (ref 65–99)
HBA1C MFR BLD: 6.4 % (ref 4–5.6)
HDLC SERPL-MCNC: 67 MG/DL
LDLC SERPL CALC-MCNC: 130 MG/DL
MICROALBUMIN UR-MCNC: <1.2 MG/DL
MICROALBUMIN/CREAT UR: NORMAL MG/G (ref 0–30)
POTASSIUM SERPL-SCNC: 4.5 MMOL/L (ref 3.6–5.5)
PROT SERPL-MCNC: 6.7 G/DL (ref 6–8.2)
SODIUM SERPL-SCNC: 137 MMOL/L (ref 135–145)
TRIGL SERPL-MCNC: 79 MG/DL (ref 0–149)

## 2021-03-11 PROCEDURE — 80061 LIPID PANEL: CPT

## 2021-03-11 PROCEDURE — 83036 HEMOGLOBIN GLYCOSYLATED A1C: CPT

## 2021-03-11 PROCEDURE — 82043 UR ALBUMIN QUANTITATIVE: CPT

## 2021-03-11 PROCEDURE — 36415 COLL VENOUS BLD VENIPUNCTURE: CPT

## 2021-03-11 PROCEDURE — 80053 COMPREHEN METABOLIC PANEL: CPT

## 2021-03-11 PROCEDURE — 82570 ASSAY OF URINE CREATININE: CPT

## 2021-03-24 ENCOUNTER — OFFICE VISIT (OUTPATIENT)
Dept: MEDICAL GROUP | Facility: PHYSICIAN GROUP | Age: 76
End: 2021-03-24
Payer: MEDICARE

## 2021-03-24 VITALS
RESPIRATION RATE: 18 BRPM | HEART RATE: 80 BPM | SYSTOLIC BLOOD PRESSURE: 116 MMHG | WEIGHT: 140 LBS | TEMPERATURE: 97.4 F | DIASTOLIC BLOOD PRESSURE: 60 MMHG | BODY MASS INDEX: 23.32 KG/M2 | OXYGEN SATURATION: 94 % | HEIGHT: 65 IN

## 2021-03-24 DIAGNOSIS — E11.29 TYPE 2 DIABETES MELLITUS WITH MICROALBUMINURIA, WITHOUT LONG-TERM CURRENT USE OF INSULIN (HCC): ICD-10-CM

## 2021-03-24 DIAGNOSIS — R80.9 TYPE 2 DIABETES MELLITUS WITH MICROALBUMINURIA, WITHOUT LONG-TERM CURRENT USE OF INSULIN (HCC): ICD-10-CM

## 2021-03-24 DIAGNOSIS — I15.2 HYPERTENSION ASSOCIATED WITH DIABETES (HCC): ICD-10-CM

## 2021-03-24 DIAGNOSIS — Z23 NEED FOR VACCINATION: ICD-10-CM

## 2021-03-24 DIAGNOSIS — Z78.0 POSTMENOPAUSAL: ICD-10-CM

## 2021-03-24 DIAGNOSIS — E11.59 HYPERTENSION ASSOCIATED WITH DIABETES (HCC): ICD-10-CM

## 2021-03-24 DIAGNOSIS — E78.00 HYPERCHOLESTEREMIA: ICD-10-CM

## 2021-03-24 PROCEDURE — 99214 OFFICE O/P EST MOD 30 MIN: CPT | Mod: 25 | Performed by: FAMILY MEDICINE

## 2021-03-24 PROCEDURE — 90472 IMMUNIZATION ADMIN EACH ADD: CPT | Performed by: FAMILY MEDICINE

## 2021-03-24 PROCEDURE — G0009 ADMIN PNEUMOCOCCAL VACCINE: HCPCS | Performed by: FAMILY MEDICINE

## 2021-03-24 PROCEDURE — 90732 PPSV23 VACC 2 YRS+ SUBQ/IM: CPT | Performed by: FAMILY MEDICINE

## 2021-03-24 PROCEDURE — 90750 HZV VACC RECOMBINANT IM: CPT | Performed by: FAMILY MEDICINE

## 2021-03-24 NOTE — PROGRESS NOTES
"Subjective:   Alejandra Billy is a 75 y.o. female here today for follow-up diabetes    Her diabetes is stable and most recent A1c was 6.4%.  She is not on any medication at this time.  She does have hypertension and high cholesterol.  She is taking small doses of losartan and pravastatin.  Her cholesterol has been increasing over the last year.  She tells me that her  likes to fix a big breakfast for her every morning.     Objective:     Physical Exam:  /60 (BP Location: Right arm, Patient Position: Sitting, BP Cuff Size: Adult)   Pulse 80   Temp 36.3 °C (97.4 °F) (Temporal)   Resp 18   Ht 1.651 m (5' 5\")   Wt 63.5 kg (140 lb)   SpO2 94%  Body mass index is 23.3 kg/m².   Constitutional: Alert, no distress.  Skin: Warm, dry, good turgor, no rashes in visible areas.  Eye: Conjunctiva clear, lids normal.  ENMT: Wearing a mask.  Respiratory: Unlabored respiratory effort, lungs clear to auscultation, no wheezes, no rhonchi.  Cardiovascular: Normal S1, S2, no murmur, no edema.  Psych: Alert and oriented x3, normal affect and mood.  Diabetic foot exam: No lesions or calluses noted. 2+ pedal pulses. Sensation intact with 10 out of 10 on monofilament test.    Assessment and Plan:     1. Type 2 diabetes mellitus with microalbuminuria, without long-term current use of insulin (HCC)  Chronic and well controlled without need for medication at this time.  Continue to monitor with serial labs.  Diabetic foot exam performed today and this was normal.  We attempted to do a retina exam and were unable to obtain sufficient images.  Patient was recommended to schedule an appointment with an eye doctor.  - Lipid Profile; Future  - Comp Metabolic Panel; Future  - HEMOGLOBIN A1C; Future  - POCT Retinal Eye Exam  - Diabetic Monofilament LE Exam    2. Hypertension associated with diabetes (HCC)  Well-controlled on current regimen.  Labs as indicated.  Continue antihypertensive medications.  Discussed decreasing salt " intake.  Emphasized benefits of exercise and diet. Continue to monitor.  - Comp Metabolic Panel; Future    3. Hypercholesteremia  Chronic with increase in LDL over the last year.  Patient is taking her pravastatin, but has had a slight change in her diet.  We agreed to monitor for the next 6 months.  After that time, if her LDL remains above 100, we will increase her dose of pravastatin.  Labs as indicated.  Continue statin medication and lifestyle modifications.  Continue to monitor.  - Lipid Profile; Future  - Comp Metabolic Panel; Future    4. Postmenopausal  DEXA ordered.  - DS-BONE DENSITY STUDY (DEXA); Future    5. Need for vaccination  Shingrix #1 and Pneumovax-23 discussed and administered in office today.  - Shingles Vaccine (Shingrix)  - Pneumococal Polysaccharide Vaccine 23-Valent =>1YO SQ/IM    Followup: 6 months with new PCP         PLEASE NOTE: This dictation was created using voice recognition software. I have made every reasonable attempt to correct obvious errors, but I expect that there are errors of grammar and possibly content that I did not discover before finalizing the note.

## 2021-03-26 ENCOUNTER — HOSPITAL ENCOUNTER (OUTPATIENT)
Dept: RADIOLOGY | Facility: MEDICAL CENTER | Age: 76
End: 2021-03-26
Attending: FAMILY MEDICINE
Payer: MEDICARE

## 2021-03-26 DIAGNOSIS — Z78.0 POSTMENOPAUSAL: ICD-10-CM

## 2021-03-26 PROCEDURE — 77080 DXA BONE DENSITY AXIAL: CPT

## 2021-04-29 ENCOUNTER — GYNECOLOGY VISIT (OUTPATIENT)
Dept: OBGYN | Facility: CLINIC | Age: 76
End: 2021-04-29
Payer: MEDICARE

## 2021-04-29 VITALS — SYSTOLIC BLOOD PRESSURE: 125 MMHG | BODY MASS INDEX: 22.96 KG/M2 | WEIGHT: 138 LBS | DIASTOLIC BLOOD PRESSURE: 55 MMHG

## 2021-04-29 DIAGNOSIS — Z46.89 ENCOUNTER FOR PESSARY MAINTENANCE: ICD-10-CM

## 2021-04-29 PROCEDURE — 99212 OFFICE O/P EST SF 10 MIN: CPT | Performed by: OBSTETRICS & GYNECOLOGY

## 2021-06-23 DIAGNOSIS — E11.59 HYPERTENSION ASSOCIATED WITH DIABETES (HCC): ICD-10-CM

## 2021-06-23 DIAGNOSIS — I15.2 HYPERTENSION ASSOCIATED WITH DIABETES (HCC): ICD-10-CM

## 2021-06-23 RX ORDER — LOSARTAN POTASSIUM 25 MG/1
12.5 TABLET ORAL
Qty: 50 TABLET | Refills: 0 | Status: SHIPPED | OUTPATIENT
Start: 2021-06-23 | End: 2021-09-09 | Stop reason: SDUPTHER

## 2021-07-20 ENCOUNTER — GYNECOLOGY VISIT (OUTPATIENT)
Dept: OBGYN | Facility: CLINIC | Age: 76
End: 2021-07-20
Payer: MEDICARE

## 2021-07-20 VITALS — BODY MASS INDEX: 22.8 KG/M2 | SYSTOLIC BLOOD PRESSURE: 123 MMHG | DIASTOLIC BLOOD PRESSURE: 66 MMHG | WEIGHT: 137 LBS

## 2021-07-20 DIAGNOSIS — Z46.89 PESSARY MAINTENANCE: ICD-10-CM

## 2021-07-20 PROCEDURE — 99213 OFFICE O/P EST LOW 20 MIN: CPT | Performed by: OBSTETRICS & GYNECOLOGY

## 2021-07-20 NOTE — PROGRESS NOTES
Chief Complaint   Patient presents with   • Gynecologic Exam       History of present illness: 76 y.o. presents with spotting with pessary in place.  She states that there was no intercourse or any other trauma she just had spotting in her underwear when she woke up in the morning.  Per phone advice, she did restart the Premarin cream.  She currently states that the spotting has resolved.    Review of systems:  Pertinent positives documented in HPI and all other systems reviewed & are negative    All PMH, PSH, allergies, social history and FH reviewed and updated today:  Past Medical History:   Diagnosis Date   • Hypercholesteremia 2014   • Type II or unspecified type diabetes mellitus without mention of complication, not stated as uncontrolled 2014       Past Surgical History:   Procedure Laterality Date   • APPENDECTOMY         Allergies: No Known Allergies    Family History   Problem Relation Age of Onset   • Cancer Mother         breast   • Hyperlipidemia Mother    • Cancer Father         bladder   • Genetic Disorder Father         Parkinson's   • No Known Problems Sister    • No Known Problems Brother    • Cancer Paternal Aunt         breast   • Diabetes Neg Hx    • Stroke Neg Hx    • Lung Disease Neg Hx    • Heart Disease Neg Hx    • Hypertension Neg Hx        Physical exam:  /66 (BP Location: Right arm, Patient Position: Sitting)   Wt 62.1 kg (137 lb)     General:appears stated age, is in no apparent distress  Head: normocephalic, non-tender  Neck: neck is supple, no jugular venous distension  Abdomen: Bowel sounds positive, nondistended, soft, nontender x4, no rebound or guarding. No organomegaly. No masses.    Physical Exam  Genitourinary:            Skin: No rashes, or ulcers or lesions seen  Psychiatric: Patient shows appropriate affect, is alert and oriented x3, intact judgment and insight.      Assessment  1. Pessary maintenance         Plan  - 76 y.o.  here with spotting in the  setting of pessary.  Discussed with patient abrasion likely caused by pessary.  She has been using the Premarin cream however would increase to 3 times weekly.      Offered patient removal of pessary for an interval with healing time however she is concerned that the bladder will fall further without it.  She will continue to use the Premarin cream 3 times weekly.  She will follow-up in 1 month.    - Follow up 4wk

## 2021-08-03 DIAGNOSIS — E78.00 HYPERCHOLESTEREMIA: ICD-10-CM

## 2021-08-05 RX ORDER — PRAVASTATIN SODIUM 20 MG
20 TABLET ORAL
Qty: 100 TABLET | Refills: 0 | Status: SHIPPED | OUTPATIENT
Start: 2021-08-05 | End: 2021-09-09 | Stop reason: SDUPTHER

## 2021-08-08 ENCOUNTER — PATIENT OUTREACH (OUTPATIENT)
Dept: HEALTH INFORMATION MANAGEMENT | Facility: OTHER | Age: 76
End: 2021-08-08

## 2021-08-08 NOTE — NON-PROVIDER
Outcome: Left Message    Please transfer to Patient Outreach Team at [phone] when patient returns call.    Attempt # 1

## 2021-08-19 ENCOUNTER — GYNECOLOGY VISIT (OUTPATIENT)
Dept: OBGYN | Facility: CLINIC | Age: 76
End: 2021-08-19
Payer: MEDICARE

## 2021-08-19 VITALS — SYSTOLIC BLOOD PRESSURE: 129 MMHG | DIASTOLIC BLOOD PRESSURE: 67 MMHG | WEIGHT: 137 LBS | BODY MASS INDEX: 22.8 KG/M2

## 2021-08-19 DIAGNOSIS — Z46.89 PESSARY MAINTENANCE: ICD-10-CM

## 2021-08-19 PROCEDURE — 99212 OFFICE O/P EST SF 10 MIN: CPT | Performed by: OBSTETRICS & GYNECOLOGY

## 2021-08-19 NOTE — PROGRESS NOTES
C/c: pessary follow up    History of present illness: 76 y.o. presents with pessary follow-up.  Patiently was seen previously for an abrasion in the posterior fornix.    The pessary was replaced at that last visit and the patient started using the Premarin cream 3 times weekly at night.  She states she is unsure of whether or not she has been getting it in the posterior fornix.    Review of systems:  Pertinent positives documented in HPI and all other systems reviewed & are negative    All PMH, PSH, allergies, social history and FH reviewed and updated today:  Past Medical History:   Diagnosis Date   • Hypercholesteremia 11/6/2014   • Type II or unspecified type diabetes mellitus without mention of complication, not stated as uncontrolled 11/6/2014       Past Surgical History:   Procedure Laterality Date   • APPENDECTOMY         Allergies: No Known Allergies    Social History     Socioeconomic History   • Marital status: Single     Spouse name: Not on file   • Number of children: Not on file   • Years of education: Not on file   • Highest education level: Not on file   Occupational History   • Not on file   Tobacco Use   • Smoking status: Never Smoker   • Smokeless tobacco: Never Used   • Tobacco comment: avoid all tobacco products   Substance and Sexual Activity   • Alcohol use: No     Alcohol/week: 0.0 oz     Comment: little to none   • Drug use: No   • Sexual activity: Never   Other Topics Concern   • Not on file   Social History Narrative   • Not on file     Social Determinants of Health     Financial Resource Strain:    • Difficulty of Paying Living Expenses:    Food Insecurity:    • Worried About Running Out of Food in the Last Year:    • Ran Out of Food in the Last Year:    Transportation Needs:    • Lack of Transportation (Medical):    • Lack of Transportation (Non-Medical):    Physical Activity:    • Days of Exercise per Week:    • Minutes of Exercise per Session:    Stress:    • Feeling of Stress :     Social Connections:    • Frequency of Communication with Friends and Family:    • Frequency of Social Gatherings with Friends and Family:    • Attends [employer] Services:    • Active Member of Clubs or Organizations:    • Attends Club or Organization Meetings:    • Marital Status:    Intimate Partner Violence:    • Fear of Current or Ex-Partner:    • Emotionally Abused:    • Physically Abused:    • Sexually Abused:        Physical exam:  /67 (BP Location: Right arm, Patient Position: Sitting)   Wt 62.1 kg (137 lb)     General: appears stated age, is in no apparent distress  Head: normocephalic, non-tender  Neck: neck is supple, no jugular venous distension  Female GYN:   Physical Exam  Genitourinary:              Skin: No rashes, or ulcers or lesions seen  Psychiatric: Patient shows appropriate affect, is alert and oriented x3, intact judgment and insight.    Assessment  1. Pessary maintenance         Plan  - 76 y.o.  here for pessary maintenance with abrasion.  Discussed removal of pessary for 1 week while using Premarin 0.5 g at night with reinsertion next week.  Patient concerned regarding prolapse worsening during this time however informed patient that should only need approximately 1 week.  Patient is to bring in her Premarin cream at next visit.  Pessary was removed cleaned and given to patient to take home.    - Follow up 2021

## 2021-08-26 ENCOUNTER — GYNECOLOGY VISIT (OUTPATIENT)
Dept: OBGYN | Facility: CLINIC | Age: 76
End: 2021-08-26
Payer: MEDICARE

## 2021-08-26 VITALS — SYSTOLIC BLOOD PRESSURE: 123 MMHG | BODY MASS INDEX: 22.63 KG/M2 | WEIGHT: 136 LBS | DIASTOLIC BLOOD PRESSURE: 59 MMHG

## 2021-08-26 DIAGNOSIS — Z46.89 PESSARY MAINTENANCE: ICD-10-CM

## 2021-08-26 PROCEDURE — 99212 OFFICE O/P EST SF 10 MIN: CPT | Performed by: OBSTETRICS & GYNECOLOGY

## 2021-08-26 NOTE — PROGRESS NOTES
Pessary Insertion:  S: 76 y.o.  here for pessary insertion.  Per instructions, patient left pessary out for 1 week during which she used Premarin cream nightly.  She denies any further bleeding and states that although she does have to reduce the bladder prolapse, it has not gotten any worse.    Pessary type: #6 ring pessary    PE:  Vitals:    21 1139   BP: 123/59     SSE: Previously noted area of erosion is healed at this point.  Trace amount of creamy white discharge noted in the vaginal vault.  Pessary was reinserted without difficulty    AP:    Advised patient to decrease Premarin use to 2-3 times per week.  She is to follow-up in 3 months and bring her Premarin with her.    Pt is to call for any pain, discharge or bleeding from the device.

## 2021-09-07 ENCOUNTER — HOSPITAL ENCOUNTER (OUTPATIENT)
Dept: LAB | Facility: MEDICAL CENTER | Age: 76
End: 2021-09-07
Attending: FAMILY MEDICINE
Payer: MEDICARE

## 2021-09-07 DIAGNOSIS — E11.59 HYPERTENSION ASSOCIATED WITH DIABETES (HCC): ICD-10-CM

## 2021-09-07 DIAGNOSIS — E11.29 TYPE 2 DIABETES MELLITUS WITH MICROALBUMINURIA, WITHOUT LONG-TERM CURRENT USE OF INSULIN (HCC): ICD-10-CM

## 2021-09-07 DIAGNOSIS — E78.00 HYPERCHOLESTEREMIA: ICD-10-CM

## 2021-09-07 DIAGNOSIS — R80.9 TYPE 2 DIABETES MELLITUS WITH MICROALBUMINURIA, WITHOUT LONG-TERM CURRENT USE OF INSULIN (HCC): ICD-10-CM

## 2021-09-07 DIAGNOSIS — I15.2 HYPERTENSION ASSOCIATED WITH DIABETES (HCC): ICD-10-CM

## 2021-09-07 LAB
ALBUMIN SERPL BCP-MCNC: 4.1 G/DL (ref 3.2–4.9)
ALBUMIN/GLOB SERPL: 1.6 G/DL
ALP SERPL-CCNC: 75 U/L (ref 30–99)
ALT SERPL-CCNC: 12 U/L (ref 2–50)
ANION GAP SERPL CALC-SCNC: 12 MMOL/L (ref 7–16)
AST SERPL-CCNC: 14 U/L (ref 12–45)
BILIRUB SERPL-MCNC: 0.4 MG/DL (ref 0.1–1.5)
BUN SERPL-MCNC: 11 MG/DL (ref 8–22)
CALCIUM SERPL-MCNC: 9.1 MG/DL (ref 8.5–10.5)
CHLORIDE SERPL-SCNC: 100 MMOL/L (ref 96–112)
CHOLEST SERPL-MCNC: 210 MG/DL (ref 100–199)
CO2 SERPL-SCNC: 24 MMOL/L (ref 20–33)
CREAT SERPL-MCNC: 0.62 MG/DL (ref 0.5–1.4)
EST. AVERAGE GLUCOSE BLD GHB EST-MCNC: 126 MG/DL
FASTING STATUS PATIENT QL REPORTED: NORMAL
GLOBULIN SER CALC-MCNC: 2.5 G/DL (ref 1.9–3.5)
GLUCOSE SERPL-MCNC: 91 MG/DL (ref 65–99)
HBA1C MFR BLD: 6 % (ref 4–5.6)
HDLC SERPL-MCNC: 70 MG/DL
LDLC SERPL CALC-MCNC: 128 MG/DL
POTASSIUM SERPL-SCNC: 4.6 MMOL/L (ref 3.6–5.5)
PROT SERPL-MCNC: 6.6 G/DL (ref 6–8.2)
SODIUM SERPL-SCNC: 136 MMOL/L (ref 135–145)
TRIGL SERPL-MCNC: 60 MG/DL (ref 0–149)

## 2021-09-07 PROCEDURE — 80061 LIPID PANEL: CPT

## 2021-09-07 PROCEDURE — 36415 COLL VENOUS BLD VENIPUNCTURE: CPT

## 2021-09-07 PROCEDURE — 83036 HEMOGLOBIN GLYCOSYLATED A1C: CPT

## 2021-09-07 PROCEDURE — 80053 COMPREHEN METABOLIC PANEL: CPT

## 2021-09-09 ENCOUNTER — OFFICE VISIT (OUTPATIENT)
Dept: MEDICAL GROUP | Facility: PHYSICIAN GROUP | Age: 76
End: 2021-09-09
Payer: MEDICARE

## 2021-09-09 VITALS
RESPIRATION RATE: 16 BRPM | HEIGHT: 65 IN | OXYGEN SATURATION: 96 % | DIASTOLIC BLOOD PRESSURE: 60 MMHG | BODY MASS INDEX: 22.46 KG/M2 | SYSTOLIC BLOOD PRESSURE: 120 MMHG | HEART RATE: 87 BPM | TEMPERATURE: 98.2 F | WEIGHT: 134.8 LBS

## 2021-09-09 DIAGNOSIS — E11.59 HYPERTENSION ASSOCIATED WITH DIABETES (HCC): ICD-10-CM

## 2021-09-09 DIAGNOSIS — I15.2 HYPERTENSION ASSOCIATED WITH DIABETES (HCC): ICD-10-CM

## 2021-09-09 DIAGNOSIS — Z78.0 OSTEOPENIA AFTER MENOPAUSE: ICD-10-CM

## 2021-09-09 DIAGNOSIS — Z23 NEED FOR VACCINATION: ICD-10-CM

## 2021-09-09 DIAGNOSIS — M85.80 OSTEOPENIA AFTER MENOPAUSE: ICD-10-CM

## 2021-09-09 DIAGNOSIS — E78.00 HYPERCHOLESTEREMIA: ICD-10-CM

## 2021-09-09 PROCEDURE — 99214 OFFICE O/P EST MOD 30 MIN: CPT | Mod: 25 | Performed by: STUDENT IN AN ORGANIZED HEALTH CARE EDUCATION/TRAINING PROGRAM

## 2021-09-09 PROCEDURE — 90750 HZV VACC RECOMBINANT IM: CPT | Performed by: STUDENT IN AN ORGANIZED HEALTH CARE EDUCATION/TRAINING PROGRAM

## 2021-09-09 PROCEDURE — 90471 IMMUNIZATION ADMIN: CPT | Performed by: STUDENT IN AN ORGANIZED HEALTH CARE EDUCATION/TRAINING PROGRAM

## 2021-09-09 RX ORDER — PRAVASTATIN SODIUM 40 MG
40 TABLET ORAL
Qty: 90 TABLET | Refills: 3 | Status: SHIPPED | OUTPATIENT
Start: 2021-09-09 | End: 2022-07-01

## 2021-09-09 RX ORDER — LOSARTAN POTASSIUM 25 MG/1
12.5 TABLET ORAL
Qty: 50 TABLET | Refills: 0 | Status: SHIPPED | OUTPATIENT
Start: 2021-09-09 | End: 2021-12-14

## 2021-09-09 NOTE — PATIENT INSTRUCTIONS
1) Continue calcium supplement of 1200 mg/day and vitamin d 800 iu /day.    2) Consider starting Alendronate for bone health.    3) Increase Pravastatin to 40 mg/day.    4) Increase exercise and reduce carbohydrates. Add some resistance training to your exercise regimen.

## 2021-09-09 NOTE — PROGRESS NOTES
"Subjective:     CC:  Diagnoses of Need for vaccination, Hypertension associated with diabetes (HCC), Hypercholesteremia, and Osteopenia after menopause were pertinent to this visit.    HISTORY OF THE PRESENT ILLNESS: Patient is a 76 y.o. female. This pleasant patient is here today to establish care and discuss her March DEXA scan and recent lab work. Her prior PCP was Aye Sweeney MD.    Active Diagnosis:    Patient Active Problem List   Diagnosis   • Hypercholesteremia   • Type 2 diabetes mellitus with microalbuminuria, without long-term current use of insulin (HCC)   • Eczema of both hands   • Hypertension associated with diabetes (HCC)   • POP-Q stage 2 cystocele   • Encounter for fitting and adjustment of pessary   • Vulvovaginitis   • Overflow diarrhea   • Osteopenia after menopause          Current Outpatient Medications Ordered in Epic   Medication Sig Dispense Refill   • losartan (COZAAR) 25 MG Tab Take 0.5 Tablets by mouth every day for 100 days. 50 Tablet 0   • pravastatin (PRAVACHOL) 40 MG tablet Take 1 Tablet by mouth every day. 90 Tablet 3   • estrogens, conjugated (PREMARIN) 0.625 MG/GM Cream Apply 0.5mg per vagina nightly for 2 weeks then decrease to 3 times weekly 30 g 6     No current Epic-ordered facility-administered medications on file.     ROS:   Gen: No fevers/chills, no changes in weight  HEENT: No changes in vision/hearing, sore throat.  Pulm: No cough, unexplained SOB.  CV: No chest pain/pressure, no palpitations  GI: No nausea/vomiting, no diarrhea  : No dysuria/nocturia  MSk: No myalgias  Skin: No rash/skin changes  Neuro: No headaches, no numbness/tingling  Heme/Lymph: no easy bruising      Objective:     Exam: /60 (BP Location: Left arm, Patient Position: Sitting, BP Cuff Size: Adult)   Pulse 87   Temp 36.8 °C (98.2 °F) (Temporal)   Resp 16   Ht 1.651 m (5' 5\")   Wt 61.1 kg (134 lb 12.8 oz)   SpO2 96%  Body mass index is 22.43 kg/m².    General: Normal appearing. No " distress.  HEENT: Normocephalic. Eyes conjunctiva clear lids without ptosis, pupils equal and reactive to light accommodation. Ears normal shape and contour, canals are occluded with cerumen bilaterally, tympanic membranes are benign, nasal mucosa benign, oropharynx is without erythema, edema or exudates.   Neck: Supple without JVD or bruit. Thyroid is not enlarged.  Pulmonary: Clear to ausculation.  Normal effort. No rales, ronchi, or wheezing.  Cardiovascular: Regular rate and rhythm without murmur. Radial pulses are intact and equal bilaterally.  Abdomen: Soft, nontender, nondistended. Normal bowel sounds. Liver and spleen are not palpable  Neurologic: Grossly nonfocal.  CN II through XII intact.  Lymph: No cervical or supraclavicular lymph nodes are palpable  Skin: Warm and dry.  No obvious lesions.  Musculoskeletal: Normal gait. No extremity cyanosis, clubbing, or edema.  Psych: Normal mood and affect. Alert and oriented x3. Judgment and insight are normal.    A chaperone was offered to the patient during today's exam. Patient declined chaperone.    Labs:   9/7/2021:  -CMP revealing blood sugar of 91 glycohemoglobin of 6.0  -Lipid panel revealing total cholesterol 210, triglycerides 60, HDL 70,     3/26/2021:  -DEXA scan revealing 10-year probability of fracture major osteoporotic 13.2% and hip 3.5%      Assessment & Plan:   76 y.o. female with the following -    1. Need for vaccination  - Provided in clinic today.  - Shingles Vaccine (Shingrix)    2. Hypertension associated with diabetes (HCC)  -Chronic, stable, well controlled.  Today's blood pressure 120/60.  -We discussed diet/exercise in great detail today.  We have discussed how this is critical for staying off diabetes medications.  At this point, with a fasting blood sugar of less than 100 and a pre-diabetic range hemoglobin A1c, we can treat with lifestyle modification rather than medication.  -Continue low-dose losartan of 12.5 mg/day for renal  protection and blood pressure maintenance.    3. Hypercholesteremia  -I have carefully reviewed Dr. Sweeney's last note and will continue with her treatment plan of increasing pravastatin to 40 mg/day due to the increased LDL.    4.  Osteopenia  -I have discussed the interpretation of the DEXA scan and FRAX score with .   -I have recommended therapy with alendronate.  The patient wishes to think on this for a while.    Return in about 1 year (around 9/9/2022).    Please note that this dictation was created using voice recognition software. I have made every reasonable attempt to correct obvious errors, but I expect that there are errors of grammar and possibly content that I did not discover before finalizing the note.      Howard Younger PA-C 9/9/2021

## 2021-11-10 DIAGNOSIS — E78.00 HYPERCHOLESTEREMIA: ICD-10-CM

## 2021-11-11 RX ORDER — PRAVASTATIN SODIUM 20 MG
20 TABLET ORAL
Qty: 100 TABLET | Refills: 0 | OUTPATIENT
Start: 2021-11-11

## 2021-11-18 ENCOUNTER — PHARMACY VISIT (OUTPATIENT)
Dept: PHARMACY | Facility: MEDICAL CENTER | Age: 76
End: 2021-11-18
Payer: COMMERCIAL

## 2021-11-18 PROCEDURE — RXMED WILLOW AMBULATORY MEDICATION CHARGE: Performed by: INTERNAL MEDICINE

## 2021-11-18 RX ORDER — CX-024414 0.2 MG/ML
0.25 INJECTION, SUSPENSION INTRAMUSCULAR
Qty: 0.25 ML | Refills: 0 | Status: SHIPPED | OUTPATIENT
Start: 2021-11-18 | End: 2022-12-05

## 2022-03-31 ENCOUNTER — OFFICE VISIT (OUTPATIENT)
Dept: DERMATOLOGY | Facility: IMAGING CENTER | Age: 77
End: 2022-03-31
Payer: MEDICARE

## 2022-03-31 DIAGNOSIS — Z12.83 SKIN CANCER SCREENING: ICD-10-CM

## 2022-03-31 DIAGNOSIS — D22.9 MULTIPLE NEVI: ICD-10-CM

## 2022-03-31 DIAGNOSIS — L30.9 HAND DERMATITIS: ICD-10-CM

## 2022-03-31 DIAGNOSIS — L82.1 SK (SEBORRHEIC KERATOSIS): ICD-10-CM

## 2022-03-31 DIAGNOSIS — D18.01 CHERRY ANGIOMA: ICD-10-CM

## 2022-03-31 DIAGNOSIS — L73.8 SEBACEOUS HYPERPLASIA: ICD-10-CM

## 2022-03-31 DIAGNOSIS — L81.4 LENTIGINES: ICD-10-CM

## 2022-03-31 PROCEDURE — 99213 OFFICE O/P EST LOW 20 MIN: CPT | Performed by: NURSE PRACTITIONER

## 2022-03-31 RX ORDER — CLOBETASOL PROPIONATE 0.5 MG/G
OINTMENT TOPICAL
Qty: 45 G | Refills: 1 | Status: SHIPPED | OUTPATIENT
Start: 2022-03-31 | End: 2022-09-09

## 2022-03-31 NOTE — PROGRESS NOTES
DERMATOLOGY NOTE  NEW VISIT       Chief complaint: No chief complaint on file.   Denies new, growing, changing, itching or bleeding skin lesions today.    History of skin cancer: No  History of precancers/actinic keratoses: No  History of biopsies:Yes, Details: benign, mole--will get outside records  History of blistering/severe sunburns:Yes, Details: once, teens  Family history of skin cancer:No  Family history of atypical moles:No      No Known Allergies     MEDICATIONS:  Medications relevant to specialty reviewed.     REVIEW OF SYSTEMS:   Positive for skin (see HPI)  Negative for fevers and chills       EXAM:  LMP  (LMP Unknown)   Constitutional: Well-developed, well-nourished, and in no distress.     A total body skin exam was performed excluding the genitals per patient preference and including the following areas: head (including face), neck, chest, abdomen, groin/buttocks, back, bilateral upper extremities, and bilateral lower extremities with the following pertinent findings listed below and/or in assessment/plan.     -sun exposed skin of trunk and b/l upper, lower extremities and face with scattered clinically benign light brown reticulated macules all of which were morphologically similar and none of which were suspicious for skin cancer today on exam    Several tan medium brown stuck-on waxy papules scattered on the face, trunk and extremities    Multiple tan medium brown skin-colored macules papules scattered over the trunk, face and extremities, skdermben      Several scattered 1-3mm bright red macules and thin papules on the face, trunk and extremities    Sebaceous hyperplasia to forehead    Very mild desquamation with slight erythema to bilateral palms    IMPRESSION / PLAN:    1. Multiple nevi  - Benign-appearing nature of lesions discussed. Advised to return to clinic for any new or concerning changes.  - ABCDE's of melanoma discussed      2. Lentigines  - Benign-appearing nature of lesions  discussed. Advised to return to clinic for any new or concerning changes.      3. Cherry angioma  - Benign-appearing nature of lesions discussed. Advised to return to clinic for any new or concerning changes.      4. SK (seborrheic keratosis)  - Benign-appearing nature of lesions discussed. Advised to return to clinic for any new or concerning changes.      5. Sebaceous hyperplasia  - Benign-appearing nature of lesions discussed. Advised to return to clinic for any new or concerning changes.      6. Hand dermatitis  Rx as below   Recommended short, tepid baths/showers daily and then to immediately apply emollient-type cream such as Eucerin cream, Ceravue, Nutraderm, Cetaphil or ointments such as aquaphor, petroleum jelly to skin while still slightly damp. Avoid products with known irritants such as fragrance or dyes.         - clobetasol (TEMOVATE) 0.05 % Ointment; AAA twice a day for 1-2 weeks at a time  Dispense: 45 g; Refill: 1    7. Skin cancer screening  Skin cancer education  - discussed importance of sun protective clothing, eyewear  - discussed importance of daily use of broad spectrum sunscreen with SPF 30 or greater, as well as need for reapplication ~every 2 hours when exposed to UVR  - discussed importance of regular self-exams, ideally once per month, every 12 months exams in clinic  - ABCDE's of melanoma discussed  - patient to bring any new or concerning lesions to my attention  - Patient educational handout provided and reviewed with patient        Discussed risks, benefits, alternative treatments as well as common side effects associated with prescribed treatment, Patient verbalized understanding and agrees with plan regarding the above         Please note that this dictation was created using voice recognition software. I have made every reasonable attempt to correct obvious errors, but I expect that there are errors of grammar and possibly content that I did not discover before finalizing the  note.      Return to clinic in: Return in about 1 year (around 3/31/2023) for DACIA. and as needed for any new or changing skin lesions.

## 2022-04-21 ENCOUNTER — GYNECOLOGY VISIT (OUTPATIENT)
Dept: OBGYN | Facility: CLINIC | Age: 77
End: 2022-04-21
Payer: MEDICARE

## 2022-04-21 VITALS — SYSTOLIC BLOOD PRESSURE: 132 MMHG | BODY MASS INDEX: 22.96 KG/M2 | DIASTOLIC BLOOD PRESSURE: 65 MMHG | WEIGHT: 138 LBS

## 2022-04-21 DIAGNOSIS — N81.11 MIDLINE CYSTOCELE: ICD-10-CM

## 2022-04-21 PROCEDURE — 99212 OFFICE O/P EST SF 10 MIN: CPT | Performed by: OBSTETRICS & GYNECOLOGY

## 2022-04-21 NOTE — PROGRESS NOTES
Pessary Insertion:  S: 76 y.o.  here for pessary insertion and cleaning. Had been using premarin but forgot and had some spotting which resolved after she restarted.    Pessary type: #6 ring    PE:  Vitals:    22 0817   BP: 132/65     SSE: small amount of eroded tissue in the posterior fornix.     AP:   Pessary removed and cleaned. Reinserted with premarin added. Pt tolerated well.   Instructed pt to continue premarin intravaginal 2-3 times weekly    Pt is to call for any pain, discharge or bleeding from the device.  F/u 6months

## 2022-07-01 DIAGNOSIS — E78.00 HYPERCHOLESTEREMIA: ICD-10-CM

## 2022-07-01 RX ORDER — PRAVASTATIN SODIUM 40 MG
40 TABLET ORAL
Qty: 100 TABLET | Refills: 3 | Status: SHIPPED | OUTPATIENT
Start: 2022-07-01 | End: 2022-08-11 | Stop reason: SDUPTHER

## 2022-08-09 ENCOUNTER — TELEPHONE (OUTPATIENT)
Dept: HEALTH INFORMATION MANAGEMENT | Facility: OTHER | Age: 77
End: 2022-08-09
Payer: MEDICARE

## 2022-08-11 DIAGNOSIS — I15.2 HYPERTENSION ASSOCIATED WITH DIABETES (HCC): ICD-10-CM

## 2022-08-11 DIAGNOSIS — E11.59 HYPERTENSION ASSOCIATED WITH DIABETES (HCC): ICD-10-CM

## 2022-08-11 DIAGNOSIS — E78.00 HYPERCHOLESTEREMIA: ICD-10-CM

## 2022-08-11 RX ORDER — PRAVASTATIN SODIUM 40 MG
40 TABLET ORAL
Qty: 100 TABLET | Refills: 3 | Status: SHIPPED | OUTPATIENT
Start: 2022-08-11 | End: 2022-12-05

## 2022-08-11 RX ORDER — LOSARTAN POTASSIUM 25 MG/1
12.5 TABLET ORAL DAILY
Qty: 50 TABLET | Refills: 3 | Status: SHIPPED | OUTPATIENT
Start: 2022-08-11 | End: 2023-06-10 | Stop reason: SDUPTHER

## 2022-08-30 ENCOUNTER — NON-PROVIDER VISIT (OUTPATIENT)
Dept: MEDICAL GROUP | Facility: PHYSICIAN GROUP | Age: 77
End: 2022-08-30
Payer: MEDICARE

## 2022-08-30 DIAGNOSIS — Z23 NEED FOR VACCINATION: ICD-10-CM

## 2022-08-30 PROCEDURE — 90746 HEPB VACCINE 3 DOSE ADULT IM: CPT | Performed by: STUDENT IN AN ORGANIZED HEALTH CARE EDUCATION/TRAINING PROGRAM

## 2022-08-30 PROCEDURE — G0010 ADMIN HEPATITIS B VACCINE: HCPCS | Performed by: STUDENT IN AN ORGANIZED HEALTH CARE EDUCATION/TRAINING PROGRAM

## 2022-08-30 NOTE — PROGRESS NOTES
"Alejandra Billy is a 77 y.o. female here for a non-provider visit for:   HEPATITIS B 1 of 3    Reason for immunization: Overdue/Provider Recommended  Immunization records indicate need for vaccine: Yes, confirmed with Epic  Minimum interval has been met for this vaccine: Yes  ABN completed: Not Indicated    VIS  was given to patient: Yes  All IAC Questionnaire questions were answered \"No.\"    Patient tolerated injection and no adverse effects were observed or reported: Yes    Pt scheduled for next dose in series: Not Indicated  "

## 2022-09-09 ENCOUNTER — OFFICE VISIT (OUTPATIENT)
Dept: MEDICAL GROUP | Facility: PHYSICIAN GROUP | Age: 77
End: 2022-09-09
Payer: MEDICARE

## 2022-09-09 VITALS
OXYGEN SATURATION: 94 % | TEMPERATURE: 97.7 F | WEIGHT: 132 LBS | HEART RATE: 83 BPM | DIASTOLIC BLOOD PRESSURE: 68 MMHG | BODY MASS INDEX: 21.99 KG/M2 | HEIGHT: 65 IN | SYSTOLIC BLOOD PRESSURE: 120 MMHG | RESPIRATION RATE: 12 BRPM

## 2022-09-09 DIAGNOSIS — Z12.31 ENCOUNTER FOR SCREENING MAMMOGRAM FOR MALIGNANT NEOPLASM OF BREAST: ICD-10-CM

## 2022-09-09 DIAGNOSIS — I15.2 HYPERTENSION ASSOCIATED WITH DIABETES (HCC): ICD-10-CM

## 2022-09-09 DIAGNOSIS — R80.9 TYPE 2 DIABETES MELLITUS WITH MICROALBUMINURIA, WITHOUT LONG-TERM CURRENT USE OF INSULIN (HCC): ICD-10-CM

## 2022-09-09 DIAGNOSIS — E11.29 TYPE 2 DIABETES MELLITUS WITH MICROALBUMINURIA, WITHOUT LONG-TERM CURRENT USE OF INSULIN (HCC): ICD-10-CM

## 2022-09-09 DIAGNOSIS — E11.59 HYPERTENSION ASSOCIATED WITH DIABETES (HCC): ICD-10-CM

## 2022-09-09 PROCEDURE — G0439 PPPS, SUBSEQ VISIT: HCPCS | Performed by: STUDENT IN AN ORGANIZED HEALTH CARE EDUCATION/TRAINING PROGRAM

## 2022-09-09 ASSESSMENT — ENCOUNTER SYMPTOMS: GENERAL WELL-BEING: EXCELLENT

## 2022-09-09 ASSESSMENT — PATIENT HEALTH QUESTIONNAIRE - PHQ9: CLINICAL INTERPRETATION OF PHQ2 SCORE: 0

## 2022-09-09 ASSESSMENT — ACTIVITIES OF DAILY LIVING (ADL): BATHING_REQUIRES_ASSISTANCE: 0

## 2022-09-09 NOTE — PROGRESS NOTES
Chief Complaint   Patient presents with    Annual Exam     HPI:  Alejandra Billy is a 77 y.o. here for Medicare Annual Wellness Visit     1.  Hypertension associated with diabetes  Condition present.  Treated with losartan 25 mg daily.    2.  Type 2 diabetes mellitus  Patient has a history of hemoglobin A1c of 6.5% with microalbumin urea of 40 mg/g.      Patient Active Problem List    Diagnosis Date Noted    Osteopenia after menopause 09/09/2021    Overflow diarrhea 01/22/2021    Vulvovaginitis 07/01/2019    Encounter for fitting and adjustment of pessary 12/19/2018    POP-Q stage 2 cystocele 10/23/2018    Hypertension associated with diabetes (HCC) 09/11/2018    Eczema of both hands 05/24/2018    Hypercholesteremia 11/06/2014    Type 2 diabetes mellitus with microalbuminuria, without long-term current use of insulin ([facility]) 11/06/2014       Current Outpatient Medications   Medication Sig Dispense Refill    losartan (COZAAR) 25 MG Tab Take 0.5 Tablets by mouth every day. 50 Tablet 3    pravastatin (PRAVACHOL) 40 MG tablet Take 1 Tablet by mouth every day. 100 Tablet 3    estrogens, conjugated (PREMARIN) 0.625 MG/GM Cream Apply 0.5mg per vagina nightly for 2 weeks then decrease to 3 times weekly 30 g 6    clobetasol (TEMOVATE) 0.05 % Ointment AAA twice a day for 1-2 weeks at a time (Patient not taking: Reported on 9/9/2022) 45 g 1    COVID-19 mRNA vaccine, Moderna, (MODERNA COVID-19 VACCINE) 100 MCG/0.5ML Suspension injection Inject 0.25 mL into the shoulder, thigh, or buttocks. 0.25 mL 0     No current facility-administered medications for this visit.          Current supplements as per medication list.     Allergies: Patient has no known allergies.    Current social contact/activities: Regular [employer] league.    She  reports that she has never smoked. She has never used smokeless tobacco. She reports that she does not drink alcohol and does not use drugs.  Counseling given: Not Answered  Tobacco comments: avoid all  tobacco products      DPA/Advanced Directive:  Patient has Advanced Directive on file.     ROS:    Gait: Uses no assistive device  Ostomy: No  Other tubes: No  Amputations: No  Chronic oxygen use: No  Last eye exam: 2022  Wears hearing aids: No   : Denies any urinary leakage during the last 6 months    Screening:    Depression Screening  Little interest or pleasure in doing things?  0 - not at all  Feeling down, depressed , or hopeless? 0 - not at all  Patient Health Questionnaire Score: 0     If depressive symptoms identified deferred to follow up visit unless specifically addressed in assessment and plan.    Interpretation of PHQ-9 Total Score   Score Severity   1-4 No Depression   5-9 Mild Depression   10-14 Moderate Depression   15-19 Moderately Severe Depression   20-27 Severe Depression    Screening for Cognitive Impairment  Three Minute Recall (daughter, heaven, cuco) 2/3    Moi clock face with all 12 numbers and set the hands to show 10 past 11.  Yes    Cognitive concerns identified deferred for follow up unless specifically addressed in assessment and plan.    Fall Risk Assessment  Has the patient had two or more falls in the last year or any fall with injury in the last year?  No    Safety Assessment  Throw rugs on floor.  Yes  Handrails on all stairs.  Yes  Good lighting in all hallways.  Yes  Difficulty hearing.  No  Patient counseled about all safety risks that were identified.    Functional Assessment ADLs  Are there any barriers preventing you from cooking for yourself or meeting nutritional needs?  No.    Are there any barriers preventing you from driving safely or obtaining transportation?  No.    Are there any barriers preventing you from using a telephone or calling for help?  No.    Are there any barriers preventing you from shopping?  No.    Are there any barriers preventing you from taking care of your own finances?  No.    Are there any barriers preventing you from managing your  medications?  No.    Are there any barriers preventing you from showering, bathing or dressing yourself?  No.    Are you currently engaging in any exercise or physical activity?  Yes.     What is your perception of your health?  Excellent.      Health Maintenance Summary            Overdue - IMM DTaP/Tdap/Td Vaccine (1 - Tdap) Overdue - never done      No completion history exists for this topic.              Overdue - RETINAL SCREENING (Yearly) Overdue since 5/22/2020 05/22/2019  REFERRAL FOR RETINAL SCREENING EXAM    06/21/2017  REFERRAL FOR RETINAL SCREENING EXAM    06/24/2016  REFERRAL FOR RETINAL SCREENING EXAM    01/08/2013  AMB REFERRAL FOR RETINAL SCREENING EXAM              Ordered - MAMMOGRAM (Yearly) Ordered on 9/9/2022 01/14/2021  MA-SCREENING MAMMO BILAT W/TOMOSYNTHESIS W/CAD    01/25/2019  MA-SCREENING MAMMO BILAT W/TOMOSYNTHESIS W/CAD    01/20/2018  MA-MAMMO SCREENING BILAT W/MAGEN W/CAD    07/19/2016  MA-SCREEN MAMMO W/CAD-BILAT    11/06/2014  Postponed - exam ordered    Only the first 5 history entries have been loaded, but more history exists.              Ordered - A1C SCREENING (Every 6 Months) Ordered on 9/9/2022 09/07/2021  HEMOGLOBIN A1C    03/11/2021  HEMOGLOBIN A1C    06/29/2020  HEMOGLOBIN A1C    10/21/2019  POCT Hemoglobin A1C    02/01/2019  HEMOGLOBIN A1C    Only the first 5 history entries have been loaded, but more history exists.              Ordered - URINE ACR / MICROALBUMIN (Yearly) Ordered on 9/9/2022 03/11/2021  MICROALBUMIN CREAT RATIO URINE    06/29/2020  MICROALBUMIN CREAT RATIO URINE    02/01/2019  MICROALBUMIN CREAT RATIO URINE (LAB COLLECT)    07/27/2017  MICROALBUMIN CREAT RATIO URINE    05/13/2016  MICROALBUMIN CREAT RATIO URINE    Only the first 5 history entries have been loaded, but more history exists.              Overdue - COVID-19 Vaccine (4 - Booster for Moderna series) Overdue since 3/18/2022      11/18/2021  Imm Admin: MODERNA SARS-COV-2  VACCINE (12+)    03/05/2021  Imm Admin: MODERNA SARS-COV-2 VACCINE (12+)    01/27/2021  Imm Admin: MODERNA SARS-COV-2 VACCINE (12+)              Overdue - IMM INFLUENZA (1) Overdue since 9/1/2022 09/12/2021  Imm Admin: Influenza Vaccine Adult HD    10/23/2020  Imm Admin: Influenza Vaccine Adult HD    10/21/2019  Imm Admin: Influenza Vaccine Adult HD              Ordered - FASTING LIPID PROFILE (Yearly) Ordered on 9/9/2022 09/07/2021  Lipid Profile    03/11/2021  Lipid Profile    06/29/2020  Lipid Profile    02/01/2019  LIPID PROFILE    07/27/2017  LIPID PROFILE    Only the first 5 history entries have been loaded, but more history exists.              Ordered - SERUM CREATININE (Yearly) Ordered on 9/9/2022 09/07/2021  Comp Metabolic Panel    03/11/2021  Comp Metabolic Panel    06/29/2020  Comp Metabolic Panel    08/19/2019  Comp Metabolic Panel    04/03/2018  COMP METABOLIC PANEL    Only the first 5 history entries have been loaded, but more history exists.              IMM HEP B VACCINE (2 of 3 - 3-dose series) Next due on 9/27/2022 08/30/2022  Imm Admin: Hepatitis B Vaccine (Adol/Adult)              DIABETES MONOFILAMENT / LE EXAM (Yearly) Next due on 9/9/2023 09/09/2022  Smart[employer]: WORKFLOW - DIABETES - DIABETIC FOOT EXAM PERFORMED    09/09/2022  Diabetic Monofilament LE Exam    03/24/2021  Diabetic Monofilament LE Exam    10/21/2019  Diabetic Monofilament LE Exam    09/05/2018  Diabetic Monofilament Lower Extremity Exam    Only the first 5 history entries have been loaded, but more history exists.              COLORECTAL CANCER SCREENING (COLONOSCOPY - Every 10 Years) Next due on 11/6/2024 01/16/2018  OCCULT BLOOD FECES IMMUNOASSAY    11/06/2014  COLONOSCOPY (Done)              BONE DENSITY (Every 5 Years) Next due on 3/26/2026      03/26/2021  DS-BONE DENSITY STUDY (DEXA)    11/06/2014  Postponed - exam ordered              IMM PNEUMOCOCCAL VACCINE: 65+ Years (Series Information)  Completed      03/24/2021  Imm Admin: Pneumococcal polysaccharide vaccine (PPSV-23)    10/21/2019  Imm Admin: Pneumococcal Conjugate Vaccine (Prevnar/PCV-13)              IMM ZOSTER VACCINES (Series Information) Completed      09/09/2021  Imm Admin: Zoster Vaccine Recombinant (RZV) (SHINGRIX)    03/24/2021  Imm Admin: Zoster Vaccine Recombinant (RZV) (SHINGRIX)              IMM MENINGOCOCCAL ACWY VACCINE (Series Information) Aged Out      No completion history exists for this topic.              Discontinued - PAP SMEAR  Discontinued      No completion history exists for this topic.                    Patient Care Team:  Howard Younger P.A.-C. as PCP - General (Physician Assistant)  Flaco Nuno M.D. as PCP - [facility] as Consulting Physician (Medical Oncology)  [facility] as Consulting Physician (Ophthalmology)        Social History     Tobacco Use    Smoking status: Never    Smokeless tobacco: Never    Tobacco comments:     avoid all tobacco products   Substance Use Topics    Alcohol use: No     Alcohol/week: 0.0 oz     Comment: little to none    Drug use: No     Family History   Problem Relation Age of Onset    Cancer Mother         breast    Hyperlipidemia Mother     Cancer Father         bladder    Genetic Disorder Father         Parkinson's    No Known Problems Sister     No Known Problems Brother     Cancer Paternal Aunt         breast    Diabetes Neg Hx     Stroke Neg Hx     Lung Disease Neg Hx     Heart Disease Neg Hx     Hypertension Neg Hx      She  has a past medical history of Hypercholesteremia (11/6/2014) and Type II or unspecified type diabetes mellitus without mention of complication, not stated as uncontrolled (11/6/2014).   Past Surgical History:   Procedure Laterality Date    APPENDECTOMY         Exam:   /68 (BP Location: Right arm, Patient Position: Sitting, BP Cuff Size: Adult)   Pulse 83   Temp 36.5 °C (97.7 °F) (Temporal)   Resp 12    "Ht 1.651 m (5' 5\")   Wt 59.9 kg (132 lb)   SpO2 94%  Body mass index is 21.97 kg/m².    Hearing good.    Dentition good  Alert, oriented in no acute distress.  Eye contact is good, speech goal directed, affect calm    Monofilament testing with a 10 gram force: sensation intact: intact bilaterally  Visual Inspection: Feet without maceration, ulcers, fissures.  Pedal pulses: intact bilaterally    Assessment and Plan. The following treatment and monitoring plan is recommended:    1. Encounter for screening mammogram for malignant neoplasm of breast  - MA-SCREENING MAMMO BILAT W/TOMOSYNTHESIS W/CAD; Future    2. Type 2 diabetes mellitus with microalbuminuria, without long-term current use of insulin (HCC)  - Diabetic Monofilament LE Exam  - Basic Metabolic Panel; Future  - HEMOGLOBIN A1C; Future  - Lipid Profile; Future  - Microalbumin Creat Ratio Urine (Lab Collect); Future    3. Hypertension associated with diabetes (HCC)  -Chronic, stable.    -Continue losartan 25 mg daily.    Patient refuses all vaccines today.    Services suggested: No services needed at this time  Health Care Screening: Age-appropriate preventive services recommended by USPTF and ACIP covered by Medicare were discussed today. Services ordered if indicated and agreed upon by the patient.  Referrals offered: Community-based lifestyle interventions to reduce health risks and promote self-management and wellness, fall prevention, nutrition, physical activity, tobacco-use cessation, weight loss, and mental health services as per orders if indicated.    Discussion today about general wellness and lifestyle habits:    Prevent falls and reduce trip hazards; Cautioned about securing or removing rugs.  Have a working fire alarm and carbon monoxide detector;   Engage in regular physical activity and social activities     Follow-up: No follow-ups on file.  "

## 2022-09-22 ENCOUNTER — HOSPITAL ENCOUNTER (OUTPATIENT)
Dept: RADIOLOGY | Facility: MEDICAL CENTER | Age: 77
End: 2022-09-22
Attending: STUDENT IN AN ORGANIZED HEALTH CARE EDUCATION/TRAINING PROGRAM
Payer: MEDICARE

## 2022-09-22 DIAGNOSIS — Z12.31 ENCOUNTER FOR SCREENING MAMMOGRAM FOR MALIGNANT NEOPLASM OF BREAST: ICD-10-CM

## 2022-09-22 PROCEDURE — 77063 BREAST TOMOSYNTHESIS BI: CPT

## 2022-10-06 ENCOUNTER — NON-PROVIDER VISIT (OUTPATIENT)
Dept: MEDICAL GROUP | Facility: PHYSICIAN GROUP | Age: 77
End: 2022-10-06
Payer: MEDICARE

## 2022-10-06 ENCOUNTER — TELEPHONE (OUTPATIENT)
Dept: MEDICAL GROUP | Facility: PHYSICIAN GROUP | Age: 77
End: 2022-10-06

## 2022-10-06 DIAGNOSIS — Z23 NEED FOR VACCINATION: ICD-10-CM

## 2022-10-06 DIAGNOSIS — E11.59 HYPERTENSION ASSOCIATED WITH DIABETES (HCC): ICD-10-CM

## 2022-10-06 DIAGNOSIS — I15.2 HYPERTENSION ASSOCIATED WITH DIABETES (HCC): ICD-10-CM

## 2022-10-06 PROCEDURE — G0010 ADMIN HEPATITIS B VACCINE: HCPCS | Performed by: FAMILY MEDICINE

## 2022-10-06 PROCEDURE — 90746 HEPB VACCINE 3 DOSE ADULT IM: CPT | Performed by: FAMILY MEDICINE

## 2022-10-06 PROCEDURE — 90662 IIV NO PRSV INCREASED AG IM: CPT | Performed by: FAMILY MEDICINE

## 2022-10-06 PROCEDURE — G0008 ADMIN INFLUENZA VIRUS VAC: HCPCS | Performed by: FAMILY MEDICINE

## 2022-10-06 NOTE — TELEPHONE ENCOUNTER
Patient is on the MA Schedule today for HEP B vaccine/injection.    SPECIFIC Action To Be Taken: Orders pending, please sign.

## 2022-10-14 ENCOUNTER — PHARMACY VISIT (OUTPATIENT)
Dept: PHARMACY | Facility: MEDICAL CENTER | Age: 77
End: 2022-10-14
Payer: COMMERCIAL

## 2022-10-14 PROCEDURE — RXMED WILLOW AMBULATORY MEDICATION CHARGE: Performed by: INTERNAL MEDICINE

## 2022-10-31 ENCOUNTER — HOSPITAL ENCOUNTER (OUTPATIENT)
Dept: LAB | Facility: MEDICAL CENTER | Age: 77
End: 2022-10-31
Attending: STUDENT IN AN ORGANIZED HEALTH CARE EDUCATION/TRAINING PROGRAM
Payer: MEDICARE

## 2022-10-31 DIAGNOSIS — E11.29 TYPE 2 DIABETES MELLITUS WITH MICROALBUMINURIA, WITHOUT LONG-TERM CURRENT USE OF INSULIN (HCC): ICD-10-CM

## 2022-10-31 DIAGNOSIS — R80.9 TYPE 2 DIABETES MELLITUS WITH MICROALBUMINURIA, WITHOUT LONG-TERM CURRENT USE OF INSULIN (HCC): ICD-10-CM

## 2022-10-31 LAB
ANION GAP SERPL CALC-SCNC: 13 MMOL/L (ref 7–16)
BUN SERPL-MCNC: 15 MG/DL (ref 8–22)
CALCIUM SERPL-MCNC: 9.3 MG/DL (ref 8.5–10.5)
CHLORIDE SERPL-SCNC: 101 MMOL/L (ref 96–112)
CHOLEST SERPL-MCNC: 211 MG/DL (ref 100–199)
CO2 SERPL-SCNC: 24 MMOL/L (ref 20–33)
CREAT SERPL-MCNC: 0.84 MG/DL (ref 0.5–1.4)
CREAT UR-MCNC: 137.05 MG/DL
EST. AVERAGE GLUCOSE BLD GHB EST-MCNC: 131 MG/DL
FASTING STATUS PATIENT QL REPORTED: NORMAL
GFR SERPLBLD CREATININE-BSD FMLA CKD-EPI: 71 ML/MIN/1.73 M 2
GLUCOSE SERPL-MCNC: 104 MG/DL (ref 65–99)
HBA1C MFR BLD: 6.2 % (ref 4–5.6)
HDLC SERPL-MCNC: 76 MG/DL
LDLC SERPL CALC-MCNC: 120 MG/DL
MICROALBUMIN UR-MCNC: 6.4 MG/DL
MICROALBUMIN/CREAT UR: 47 MG/G (ref 0–30)
POTASSIUM SERPL-SCNC: 4 MMOL/L (ref 3.6–5.5)
SODIUM SERPL-SCNC: 138 MMOL/L (ref 135–145)
TRIGL SERPL-MCNC: 77 MG/DL (ref 0–149)

## 2022-10-31 PROCEDURE — 80048 BASIC METABOLIC PNL TOTAL CA: CPT

## 2022-10-31 PROCEDURE — 80061 LIPID PANEL: CPT

## 2022-10-31 PROCEDURE — 36415 COLL VENOUS BLD VENIPUNCTURE: CPT

## 2022-10-31 PROCEDURE — 83036 HEMOGLOBIN GLYCOSYLATED A1C: CPT

## 2022-10-31 PROCEDURE — 82043 UR ALBUMIN QUANTITATIVE: CPT

## 2022-10-31 PROCEDURE — 82570 ASSAY OF URINE CREATININE: CPT

## 2022-11-07 ENCOUNTER — GYNECOLOGY VISIT (OUTPATIENT)
Dept: OBGYN | Facility: CLINIC | Age: 77
End: 2022-11-07
Payer: MEDICARE

## 2022-11-07 VITALS — BODY MASS INDEX: 22.3 KG/M2 | WEIGHT: 134 LBS | DIASTOLIC BLOOD PRESSURE: 58 MMHG | SYSTOLIC BLOOD PRESSURE: 121 MMHG

## 2022-11-07 DIAGNOSIS — Z46.89 PESSARY MAINTENANCE: ICD-10-CM

## 2022-11-07 PROCEDURE — 99212 OFFICE O/P EST SF 10 MIN: CPT | Performed by: OBSTETRICS & GYNECOLOGY

## 2022-11-07 RX ORDER — CONJUGATED ESTROGENS 0.62 MG/G
0.5 CREAM VAGINAL
Qty: 30 G | Refills: 6 | Status: SHIPPED | OUTPATIENT
Start: 2022-11-07 | End: 2023-11-16 | Stop reason: SDUPTHER

## 2022-11-07 NOTE — PROGRESS NOTES
Pessary Cleanin y.o.  here for pessary cleaning.  States she got a bit of bright pink spotting only when wiping a few weeks ago.  She started using the vaginal estrogen, inserted at night twice a week.   Since then, she has been having only brown discharge.  Pessary type: #6 ring    ROS     Vitals:    22 1352   BP: 121/58       O:   PE  Gen: AAO in NAD  Pelvic:  Vulva: Normal intact  Internal: Over the posterior cul-de-sac mostly around 4-6 o'clock there is an erythematous friable patch of ulceration.    Ext: nontender, non edematous    AP:   77 y.o.  with ring pessary  Pessary was removed and cleaned  Premarin was applied prior to reinsertion    Advised patient to increase Premarin to half a gram in the vagina as far she can at bedtime continuously for 7 days.  She may then back off to 2-3 times per week at that point.  Advised that if she continues to have red or brown spotting, she may have to go a week without the pessary and use Premarin at bedtime in order to heal up the ulcer.  She prefers not to do this as her cystocele is very uncomfortable without the pessary.    F/U 2mo

## 2022-12-05 ENCOUNTER — OFFICE VISIT (OUTPATIENT)
Dept: MEDICAL GROUP | Facility: PHYSICIAN GROUP | Age: 77
End: 2022-12-05
Payer: MEDICARE

## 2022-12-05 VITALS
WEIGHT: 134.2 LBS | HEIGHT: 65 IN | HEART RATE: 73 BPM | BODY MASS INDEX: 22.36 KG/M2 | DIASTOLIC BLOOD PRESSURE: 60 MMHG | SYSTOLIC BLOOD PRESSURE: 122 MMHG | RESPIRATION RATE: 16 BRPM | TEMPERATURE: 98.7 F | OXYGEN SATURATION: 98 %

## 2022-12-05 DIAGNOSIS — Z11.59 ENCOUNTER FOR HEPATITIS C SCREENING TEST FOR LOW RISK PATIENT: ICD-10-CM

## 2022-12-05 DIAGNOSIS — E78.00 HYPERCHOLESTEREMIA: ICD-10-CM

## 2022-12-05 PROCEDURE — 99214 OFFICE O/P EST MOD 30 MIN: CPT | Performed by: STUDENT IN AN ORGANIZED HEALTH CARE EDUCATION/TRAINING PROGRAM

## 2022-12-05 RX ORDER — GLUCOSAMINE/MSM/CHONDROITIN A 500-83-400
50 TABLET ORAL DAILY
Qty: 90 CAPSULE | Refills: 3 | Status: SHIPPED | OUTPATIENT
Start: 2022-12-05

## 2022-12-05 RX ORDER — ROSUVASTATIN CALCIUM 40 MG/1
40 TABLET, COATED ORAL DAILY
Qty: 30 TABLET | Refills: 2 | Status: SHIPPED | OUTPATIENT
Start: 2022-12-05 | End: 2022-12-30

## 2022-12-05 NOTE — PROGRESS NOTES
"Subjective:     CC:  Diagnoses of Hypercholesteremia and Encounter for hepatitis C screening test for low risk patient were pertinent to this visit.    HISTORY OF THE PRESENT ILLNESS: Patient is a 77 y.o. female. This pleasant patient is here today to discuss:    1. Hypercholesteremia  Patient has blood pressure within goal range and is fully compliant with her pravastatin 40 mg daily and reports no side effects.  Diet is a little bit high in carbohydrates but not too bad as far saturated fat.  She still has a greatly elevated ASCVD score of 44.6%.  She desires to reduce her cardiovascular risk is much as possible.    2. Encounter for hepatitis C screening test for low risk patient    Active Diagnosis:    Patient Active Problem List   Diagnosis    Hypercholesteremia    Type 2 diabetes mellitus with microalbuminuria, without long-term current use of insulin (HCC)    Eczema of both hands    Hypertension associated with diabetes (HCC)    POP-Q stage 2 cystocele    Encounter for fitting and adjustment of pessary    Vulvovaginitis    Overflow diarrhea    Osteopenia after menopause      Current Outpatient Medications Ordered in Epic   Medication Sig Dispense Refill    Coenzyme Q10 (COQ-10) 50 MG Cap Take 50 mg by mouth every day. 90 Capsule 3    rosuvastatin (CRESTOR) 40 MG tablet Take 1 Tablet by mouth every day. 30 Tablet 2    estrogens, conjugated (PREMARIN) 0.625 MG/GM Cream Insert 0.5 g into the vagina two times a week. 30 g 6    losartan (COZAAR) 25 MG Tab Take 0.5 Tablets by mouth every day. 50 Tablet 3     No current Epic-ordered facility-administered medications on file.     ROS:   Gen: No fevers/chills, no changes in weight      Objective:     Exam: /60 (BP Location: Left arm, Patient Position: Sitting, BP Cuff Size: Adult)   Pulse 73   Temp 37.1 °C (98.7 °F) (Temporal)   Resp 16   Ht 1.651 m (5' 5\")   Wt 60.9 kg (134 lb 3.2 oz)   SpO2 98%  Body mass index is 22.33 kg/m².    General: Normal " appearing. No distress.  neurologic: Grossly nonfocal.    Skin: Warm and dry.  No obvious lesions.  Musculoskeletal: Normal gait. No extremity cyanosis, clubbing, or edema.  Psych: Normal mood and affect. Alert and oriented x3. Judgment and insight are normal.    A chaperone was offered to the patient during today's exam. Patient declined chaperone.    Labs:   10/31/2022:  -Lipid profile showing total cholesterol 211, triglycerides 77, HDL 76, .  -BMP, WNL with exception of elevated fasting glucose.    Assessment & Plan:   77 y.o. female with the following -    1. Hypercholesteremia  -Chronic, inadequately controlled.  -The patient has been instructed to start coenzyme Q 10 for the next 7 days while concurrently taking her pravastatin.  At the end of the week she is to DC pravastatin and substitute rosuvastatin 40 mg daily.  - Coenzyme Q10 (COQ-10) 50 MG Cap; Take 50 mg by mouth every day.  Dispense: 90 Capsule; Refill: 3  - rosuvastatin (CRESTOR) 40 MG tablet; Take 1 Tablet by mouth every day.  Dispense: 30 Tablet; Refill: 2  - Lipid Profile; Future  - HEPATIC FUNCTION PANEL; Future    2. Encounter for hepatitis C screening test for low risk patient  - HCV Scrn ( [id number] 1xLife); Future    Return if symptoms worsen or fail to improve.    Please note that this dictation was created using voice recognition software. I have made every reasonable attempt to correct obvious errors, but I expect that there are errors of grammar and possibly content that I did not discover before finalizing the note.      Howard Younger PA-C 2022

## 2022-12-05 NOTE — ASSESSMENT & PLAN NOTE
Called patient and LM to call back - per notes below, patient could only do a Tuesday appt. No response to below noted message. This is a chronic condition.  The patient carries a history of type 2 diabetes with an A1c last checked June 2020 at 6.5.  Currently not taking any medications for this.  She is due for repeat labs 6/21 with PCP

## 2022-12-07 DIAGNOSIS — E78.00 HYPERCHOLESTEREMIA: ICD-10-CM

## 2022-12-07 RX ORDER — ROSUVASTATIN CALCIUM 40 MG/1
40 TABLET, COATED ORAL DAILY
Qty: 100 TABLET | Refills: 0 | OUTPATIENT
Start: 2022-12-07

## 2023-01-09 ENCOUNTER — HOSPITAL ENCOUNTER (OUTPATIENT)
Dept: LAB | Facility: MEDICAL CENTER | Age: 78
End: 2023-01-09
Attending: STUDENT IN AN ORGANIZED HEALTH CARE EDUCATION/TRAINING PROGRAM
Payer: MEDICARE

## 2023-01-09 DIAGNOSIS — Z11.59 ENCOUNTER FOR HEPATITIS C SCREENING TEST FOR LOW RISK PATIENT: ICD-10-CM

## 2023-01-09 DIAGNOSIS — E78.00 HYPERCHOLESTEREMIA: ICD-10-CM

## 2023-01-09 LAB
ALBUMIN SERPL BCP-MCNC: 4.2 G/DL (ref 3.2–4.9)
ALP SERPL-CCNC: 77 U/L (ref 30–99)
ALT SERPL-CCNC: 20 U/L (ref 2–50)
AST SERPL-CCNC: 19 U/L (ref 12–45)
BILIRUB CONJ SERPL-MCNC: <0.2 MG/DL (ref 0.1–0.5)
BILIRUB INDIRECT SERPL-MCNC: NORMAL MG/DL (ref 0–1)
BILIRUB SERPL-MCNC: 0.3 MG/DL (ref 0.1–1.5)
CHOLEST SERPL-MCNC: 153 MG/DL (ref 100–199)
FASTING STATUS PATIENT QL REPORTED: NORMAL
HCV AB SER QL: NORMAL
HDLC SERPL-MCNC: 82 MG/DL
LDLC SERPL CALC-MCNC: 59 MG/DL
PROT SERPL-MCNC: 6.7 G/DL (ref 6–8.2)
TRIGL SERPL-MCNC: 62 MG/DL (ref 0–149)

## 2023-01-09 PROCEDURE — 36415 COLL VENOUS BLD VENIPUNCTURE: CPT

## 2023-01-09 PROCEDURE — 80061 LIPID PANEL: CPT

## 2023-01-09 PROCEDURE — G0472 HEP C SCREEN HIGH RISK/OTHER: HCPCS

## 2023-01-09 PROCEDURE — 80076 HEPATIC FUNCTION PANEL: CPT

## 2023-01-23 ENCOUNTER — OFFICE VISIT (OUTPATIENT)
Dept: MEDICAL GROUP | Facility: PHYSICIAN GROUP | Age: 78
End: 2023-01-23
Payer: MEDICARE

## 2023-01-23 VITALS
RESPIRATION RATE: 16 BRPM | WEIGHT: 134.4 LBS | OXYGEN SATURATION: 94 % | BODY MASS INDEX: 22.39 KG/M2 | DIASTOLIC BLOOD PRESSURE: 70 MMHG | HEART RATE: 76 BPM | SYSTOLIC BLOOD PRESSURE: 128 MMHG | TEMPERATURE: 98.7 F | HEIGHT: 65 IN

## 2023-01-23 DIAGNOSIS — M79.642 LEFT HAND PAIN: ICD-10-CM

## 2023-01-23 DIAGNOSIS — E78.00 HYPERCHOLESTEREMIA: ICD-10-CM

## 2023-01-23 PROCEDURE — 99214 OFFICE O/P EST MOD 30 MIN: CPT | Performed by: STUDENT IN AN ORGANIZED HEALTH CARE EDUCATION/TRAINING PROGRAM

## 2023-01-23 RX ORDER — PRAVASTATIN SODIUM 40 MG
TABLET ORAL
COMMUNITY
Start: 2023-01-22 | End: 2023-01-23

## 2023-01-23 ASSESSMENT — PATIENT HEALTH QUESTIONNAIRE - PHQ9: CLINICAL INTERPRETATION OF PHQ2 SCORE: 0

## 2023-01-23 NOTE — PROGRESS NOTES
"CC:  Diagnoses of Left hand pain, Hypercholesteremia, and Type 2 diabetes mellitus with microalbuminuria, without long-term current use of insulin (HCC) were pertinent to this visit.    HISTORY OF THE PRESENT ILLNESS: Patient is a 77 y.o. female. This pleasant patient is here today to discuss:    1. Left hand pain  Ongoing for many weeks.  Patient feels a sharp pain at the carpometacarpal joint.  Feels that there is some swelling although no redness.  She does not recall any trauma or overuse activity.  She is right-handed.    2. Hypercholesteremia  Recent switch to rosuvastatin 40 mg daily due to elevated ASCVD risk.  Patient reports no myalgias and full compliance.    Active Diagnosis:    Patient Active Problem List   Diagnosis    Hypercholesteremia    Type 2 diabetes mellitus with microalbuminuria, without long-term current use of insulin (HCC)    Eczema of both hands    Hypertension associated with diabetes ([facility])    POP-Q stage 2 cystocele    Encounter for fitting and adjustment of pessary    Vulvovaginitis    Overflow diarrhea    Osteopenia after menopause    Left hand pain      Current Outpatient Medications Ordered in Epic   Medication Sig Dispense Refill    rosuvastatin (CRESTOR) 40 MG tablet TAKE 1 TABLET BY MOUTH EVERY DAY 30 Tablet 5    Coenzyme Q10 (COQ-10) 50 MG Cap Take 50 mg by mouth every day. 90 Capsule 3    estrogens, conjugated (PREMARIN) 0.625 MG/GM Cream Insert 0.5 g into the vagina two times a week. 30 g 6    losartan (COZAAR) 25 MG Tab Take 0.5 Tablets by mouth every day. 50 Tablet 3     No current Epic-ordered facility-administered medications on file.     ROS:   See HPI.    Objective:     Exam: /70 (BP Location: Left arm, Patient Position: Sitting, BP Cuff Size: Adult)   Pulse 76   Temp 37.1 °C (98.7 °F) (Temporal)   Resp 16   Ht 1.651 m (5' 5\")   Wt 61 kg (134 lb 6.4 oz)   SpO2 94%  Body mass index is 22.37 kg/m².    General: Normal appearing. No distress.  neurologic: Grossly " nonfocal.    Skin: Warm and dry.  No obvious lesions.  Musculoskeletal: Swelling evident in the thenar region of the left hand.  No bony deformity, erythema, heat.  Normal active ROM without pain.    A chaperone was offered to the patient during today's exam. Patient declined chaperone.      Assessment & Plan:   77 y.o. female with the following -    Labs:   1/9/2023:  -Lipid profile showing total cholesterol 153, triglycerides 62, HDL 82, LDL 59.    10/31/2022:  -Lipid profile showing total cholesterol 211, triglyceride 77, HDL 76, .    1. Left hand pain  - Undiagnosed new problem with uncertain prognosis.  - DX-HAND 3+ LEFT; Future    2. Hypercholesteremia  -Chronic, now at goal.  -Continue rosuvastatin 40 mg daily.      Return in about 5 weeks (around 3/1/2023).  For diabetes follow-up.    Please note that this dictation was created using voice recognition software. I have made every reasonable attempt to correct obvious errors, but I expect that there are errors of grammar and possibly content that I did not discover before finalizing the note.      Howard Younger PA-C 1/23/2023

## 2023-01-24 ENCOUNTER — APPOINTMENT (OUTPATIENT)
Dept: RADIOLOGY | Facility: IMAGING CENTER | Age: 78
End: 2023-01-24
Attending: STUDENT IN AN ORGANIZED HEALTH CARE EDUCATION/TRAINING PROGRAM
Payer: MEDICARE

## 2023-01-24 DIAGNOSIS — M79.642 LEFT HAND PAIN: ICD-10-CM

## 2023-01-24 PROCEDURE — 73130 X-RAY EXAM OF HAND: CPT | Mod: TC,LT | Performed by: RADIOLOGY

## 2023-02-09 ENCOUNTER — GYNECOLOGY VISIT (OUTPATIENT)
Dept: OBGYN | Facility: CLINIC | Age: 78
End: 2023-02-09
Payer: MEDICARE

## 2023-02-09 VITALS — BODY MASS INDEX: 22.96 KG/M2 | WEIGHT: 138 LBS | DIASTOLIC BLOOD PRESSURE: 81 MMHG | SYSTOLIC BLOOD PRESSURE: 125 MMHG

## 2023-02-09 DIAGNOSIS — N81.11 MIDLINE CYSTOCELE: ICD-10-CM

## 2023-02-09 DIAGNOSIS — Z46.89 ENCOUNTER FOR PESSARY MAINTENANCE: ICD-10-CM

## 2023-02-09 PROCEDURE — 99213 OFFICE O/P EST LOW 20 MIN: CPT | Performed by: STUDENT IN AN ORGANIZED HEALTH CARE EDUCATION/TRAINING PROGRAM

## 2023-02-09 NOTE — PROGRESS NOTES
77 y.o.  here for pessary cleaning.    She has continued to have random brown spotting vs bright red bleeding.  She noted a little bit heavier bleeding with insertion of the Premarin applicator a few weeks ago but resolved that same day.  She continues using the vaginal estrogen, inserted at night twice a week.  Notes the brown discharge every few days. No pain.    Pessary type: #6 ring    ROS negative    Vitals:   BP:  125/81   Wt 138 lbs    O:  PE  Gen: A&Ox3, in NAD, pleasant  Pelvic:     Vulva: atrophic, normal    Internal:  friable erythematous patch of ulceration still present along left vaginal side wall from approx 4 o'clock to 6 o'clock position    AP:  76yo  here for pessary cleaning     Pessary removed and cleaned. Ulceration assessed.  Premarin applied prior to reinsertion.    Advised patient to increase Premarin to half a gram in the vagina every night for 7 nights.  Recommend she try to remove the pessary, then place the premarin, then re place the pessary so we can be sure the medication gets to the area of ulceration.  She will return in 1 month.  We discussed possible pessary holiday if still not healed.  Patient does not want to do this but understands the recommendation.  Declines referral to Dr. Godinez.  All questions answered.    Total encounter time: 20 minutes  Magdi Downing D.O.

## 2023-03-02 ENCOUNTER — APPOINTMENT (OUTPATIENT)
Dept: MEDICAL GROUP | Facility: PHYSICIAN GROUP | Age: 78
End: 2023-03-02
Payer: MEDICARE

## 2023-03-09 ENCOUNTER — APPOINTMENT (OUTPATIENT)
Dept: MEDICAL GROUP | Facility: PHYSICIAN GROUP | Age: 78
End: 2023-03-09
Payer: MEDICARE

## 2023-03-09 ENCOUNTER — OFFICE VISIT (OUTPATIENT)
Dept: MEDICAL GROUP | Facility: PHYSICIAN GROUP | Age: 78
End: 2023-03-09
Payer: MEDICARE

## 2023-03-09 VITALS
DIASTOLIC BLOOD PRESSURE: 70 MMHG | RESPIRATION RATE: 16 BRPM | BODY MASS INDEX: 23.43 KG/M2 | HEIGHT: 65 IN | WEIGHT: 140.6 LBS | HEART RATE: 87 BPM | SYSTOLIC BLOOD PRESSURE: 122 MMHG | TEMPERATURE: 98.7 F | OXYGEN SATURATION: 94 %

## 2023-03-09 DIAGNOSIS — Z23 NEED FOR VACCINATION: ICD-10-CM

## 2023-03-09 DIAGNOSIS — E78.00 HYPERCHOLESTEREMIA: ICD-10-CM

## 2023-03-09 DIAGNOSIS — E11.29 TYPE 2 DIABETES MELLITUS WITH MICROALBUMINURIA, WITHOUT LONG-TERM CURRENT USE OF INSULIN (HCC): ICD-10-CM

## 2023-03-09 DIAGNOSIS — R80.9 TYPE 2 DIABETES MELLITUS WITH MICROALBUMINURIA, WITHOUT LONG-TERM CURRENT USE OF INSULIN (HCC): ICD-10-CM

## 2023-03-09 DIAGNOSIS — N18.2 STAGE 2 CHRONIC KIDNEY DISEASE: ICD-10-CM

## 2023-03-09 LAB
HBA1C MFR BLD: 6.3 % (ref ?–5.8)
POCT INT CON NEG: NEGATIVE
POCT INT CON POS: POSITIVE

## 2023-03-09 PROCEDURE — 83036 HEMOGLOBIN GLYCOSYLATED A1C: CPT | Performed by: STUDENT IN AN ORGANIZED HEALTH CARE EDUCATION/TRAINING PROGRAM

## 2023-03-09 PROCEDURE — 99214 OFFICE O/P EST MOD 30 MIN: CPT | Mod: 25 | Performed by: STUDENT IN AN ORGANIZED HEALTH CARE EDUCATION/TRAINING PROGRAM

## 2023-03-09 PROCEDURE — G0010 ADMIN HEPATITIS B VACCINE: HCPCS | Performed by: STUDENT IN AN ORGANIZED HEALTH CARE EDUCATION/TRAINING PROGRAM

## 2023-03-09 PROCEDURE — 90746 HEPB VACCINE 3 DOSE ADULT IM: CPT | Performed by: STUDENT IN AN ORGANIZED HEALTH CARE EDUCATION/TRAINING PROGRAM

## 2023-03-09 NOTE — PROGRESS NOTES
CC:  Diagnoses of Type 2 diabetes mellitus with microalbuminuria, without long-term current use of insulin (HCC), Stage 2 chronic kidney disease, Hypercholesteremia, and Need for vaccination were pertinent to this visit.    HISTORY OF THE PRESENT ILLNESS: Patient is a 77 y.o. female. This pleasant patient is here today to discuss:    1. Type 2 diabetes mellitus with microalbuminuria, without long-term current use of insulin (HCC)  2. Stage 2 chronic kidney disease  Patient continues to treat with good diet, exercise and weight maintenance.  Losartan 12.5 mg daily for kidney protection.  She reports full compliance.    3. Hypercholesteremia  Rosuvastatin 40 mg daily.  Patient reports full compliance.  No myalgias.    4. Need for vaccination  Patient is in need of her hepatitis B vaccine.      Active Diagnosis:    Patient Active Problem List   Diagnosis    Hypercholesteremia    Type 2 diabetes mellitus with microalbuminuria, without long-term current use of insulin (HCC)    Eczema of both hands    Hypertension associated with diabetes (HCC)    POP-Q stage 2 cystocele    Encounter for fitting and adjustment of pessary    Vulvovaginitis    Overflow diarrhea    Osteopenia after menopause    Left hand pain    Stage 2 chronic kidney disease      Current Outpatient Medications Ordered in Epic   Medication Sig Dispense Refill    rosuvastatin (CRESTOR) 40 MG tablet TAKE 1 TABLET BY MOUTH EVERY DAY 30 Tablet 5    Coenzyme Q10 (COQ-10) 50 MG Cap Take 50 mg by mouth every day. 90 Capsule 3    estrogens, conjugated (PREMARIN) 0.625 MG/GM Cream Insert 0.5 g into the vagina two times a week. 30 g 6    losartan (COZAAR) 25 MG Tab Take 0.5 Tablets by mouth every day. 50 Tablet 3     No current Epic-ordered facility-administered medications on file.     ROS:   See HPI.  ROS    Objective:     Exam: /70 (BP Location: Left arm, Patient Position: Sitting, BP Cuff Size: Adult)   Pulse 87   Temp 37.1 °C (98.7 °F) (Temporal)   Resp  "16   Ht 1.651 m (5' 5\")   Wt 63.8 kg (140 lb 9.6 oz)   SpO2 94%  Body mass index is 23.4 kg/m².    General: Normal appearing. No distress.  neurologic: Grossly nonfocal.    Skin: Warm and dry.  No obvious lesions.  Musculoskeletal: No extremity cyanosis, clubbing, or edema.  Psych: Normal mood and affect.     Physical Exam            Assessment & Plan:   77 y.o. female with the following -    Labs:   3/9/2023:  -POCT Hemoglobin A1c 6.3%.    10/31/2022:  -BMP showing elevated fasting glucose 104, GFR 71  -Hemoglobin A1c 6.2%  -Lipid profile showed total cholesterol 211, triglyceride 77, HDL 76, .  -Microalbumin to creatinine ratio 47.    1/9/2023:  -Lipid profile showed total cholesterol 153, triglycerides 62, HDL 82, LDL 59.    1. Type 2 diabetes mellitus with microalbuminuria, without long-term current use of insulin (HCC)  -Chronic, stable.  -Well-managed by diet and exercise.  - POCT  A1C  -Patient has an upcoming appointment with her optometrist and she will receive retinal screening at that point time.    2. Stage 2 chronic kidney disease  -Chronic with microalbuminuria.  -Patient is already on a ARB.  We discussed treatment with an SGLT2 medication such as Jardiance for increased glucose control and renal protection.  Patient wishes to hold off on this at this point in time and reevaluate after her microalbumin measurement in October.  - Comp Metabolic Panel; Future    3. Hypercholesteremia  -Chronic, stable.  Well treated without side effect.  -Continue rosuvastatin 40 mg daily.  - Lipid Profile; Future    4. Need for vaccination  -Vaccine provided in clinic today.  - Hepatitis B Vaccine Adult 20+    Return in about 7 months (around 10/2/2023).    Please note that this dictation was created using voice recognition software. I have made every reasonable attempt to correct obvious errors, but I expect that there are errors of grammar and possibly content that I did not discover before finalizing the " note.      Howard Younger PA-C 3/9/2023

## 2023-03-16 ENCOUNTER — GYNECOLOGY VISIT (OUTPATIENT)
Dept: OBGYN | Facility: CLINIC | Age: 78
End: 2023-03-16
Payer: MEDICARE

## 2023-03-16 VITALS — BODY MASS INDEX: 23.8 KG/M2 | SYSTOLIC BLOOD PRESSURE: 134 MMHG | DIASTOLIC BLOOD PRESSURE: 61 MMHG | WEIGHT: 143 LBS

## 2023-03-16 DIAGNOSIS — T83.89XD VAGINAL EROSION SECONDARY TO PESSARY USE, SUBSEQUENT ENCOUNTER: ICD-10-CM

## 2023-03-16 DIAGNOSIS — Z46.89 ENCOUNTER FOR PESSARY MAINTENANCE: ICD-10-CM

## 2023-03-16 DIAGNOSIS — N89.8 VAGINAL EROSION SECONDARY TO PESSARY USE, SUBSEQUENT ENCOUNTER: ICD-10-CM

## 2023-03-16 PROBLEM — T83.89XA VAGINAL EROSION SECONDARY TO PESSARY USE (HCC): Status: ACTIVE | Noted: 2023-03-16

## 2023-03-16 PROCEDURE — 99213 OFFICE O/P EST LOW 20 MIN: CPT | Performed by: STUDENT IN AN ORGANIZED HEALTH CARE EDUCATION/TRAINING PROGRAM

## 2023-03-16 NOTE — PROGRESS NOTES
GYN FOLLOW UP VISIT    CC:  Gynecologic Exam       77 y.o.  here for pessary cleaning.     She has continued to have random brown spotting vs bright red bleeding.  She initially had improvement and no bleeding when using the vaginal estrogen nightly x2 weeks.  Once she went to 2-3x a week, she noted return of spotting every 4-5 days. No pain.     Pessary type: #6 ring     ROS negative    O:  PE  Gen: A&Ox3, in NAD, pleasant  Pelvic:     Vulva: atrophic, normal    Internal:  friable erythematous patch of ulceration still present along left vaginal side wall, now extending to midline apex, total measuring about 3cm x 3cm     PHYSICAL EXAMINATION:  Vital Signs:   Vitals:    23 0811   BP: 134/61   Weight: 143 lb     Body mass index is 23.8 kg/m².      ASSESSMENT AND PLAN:  77 y.o.      AP:  76yo  here for reassessment of vaginal erosion due to pessary use.     Pessary removed and cleaned. Ulceration assessed.     Advised patient to increase Premarin to half a gram in the vagina every night for 14 nights.  Patient is amenable to pessary vacation x2 weeks given worsening erosion on today's exam.  Patient does not want to do this but understands the recommendation and the risks associated with worsening erosion.  Declines referral to Dr. Godinez, but may be open to it if the erosion has not healed when we reassess in two weeks.    All questions answered.    Total Time: 25 minutes spent in chart review, exam, counseling and documentation  Magdi Downing D.O.

## 2023-03-16 NOTE — PROGRESS NOTES
Pt here for pessary check  Pt states still having of and on bleeding  [phone]  Pharmacy verified

## 2023-03-27 ENCOUNTER — GYNECOLOGY VISIT (OUTPATIENT)
Dept: OBGYN | Facility: CLINIC | Age: 78
End: 2023-03-27
Payer: MEDICARE

## 2023-03-27 VITALS — BODY MASS INDEX: 23.3 KG/M2 | DIASTOLIC BLOOD PRESSURE: 57 MMHG | SYSTOLIC BLOOD PRESSURE: 135 MMHG | WEIGHT: 140 LBS

## 2023-03-27 DIAGNOSIS — T83.89XD VAGINAL EROSION SECONDARY TO PESSARY USE, SUBSEQUENT ENCOUNTER: ICD-10-CM

## 2023-03-27 DIAGNOSIS — N89.8 VAGINAL EROSION SECONDARY TO PESSARY USE, SUBSEQUENT ENCOUNTER: ICD-10-CM

## 2023-03-27 PROCEDURE — 99213 OFFICE O/P EST LOW 20 MIN: CPT | Performed by: STUDENT IN AN ORGANIZED HEALTH CARE EDUCATION/TRAINING PROGRAM

## 2023-03-27 NOTE — PROGRESS NOTES
CC:  Gynecologic Exam        77 y.o.  here for reassessment of vaginal erosion.     Since using the premarin cream every night x2 weeks with the pessary out, she reports feeling much better. No vaginal bleeding, abnormal discharge, or spotting.  No pain. Overall, her prolapse has not been as bothersome as she thought it would be.     Pessary type: #6 ring     ROS negative     O:  PE  Gen: A&Ox3, in NAD, pleasant  Pelvic:     Vulva: atrophic, normal    Internal:  erythematous patch of ulceration still present along left vaginal side wall, but much improved, now about 1x1cm and not friable.     PHYSICAL EXAMINATION:  Vital Signs:   BP:  135/57  Body mass index is 23.3 kg/m².        ASSESSMENT AND PLAN:  77 y.o.       AP:  78yo  here for reassessment of vaginal erosion due to pessary use.     Patient doing well on her pessary holiday.  Area of erosion has markedly improved over the last 2 weeks.  Recommend she continue to keep it out for now with continued nightly use of vaginal estrogen.  Her  is having a hip replacement in the near future and she desires I place her pessary prior to his surgery, as she will need to help lift him, etc.  She will come back once she knows when his surgery is scheduled.     All questions answered.    Magdi Downing D.O.

## 2023-04-05 DIAGNOSIS — E78.00 HYPERCHOLESTEREMIA: ICD-10-CM

## 2023-04-05 RX ORDER — ROSUVASTATIN CALCIUM 40 MG/1
40 TABLET, COATED ORAL DAILY
Qty: 100 TABLET | Refills: 1 | Status: SHIPPED | OUTPATIENT
Start: 2023-04-05 | End: 2023-04-13 | Stop reason: SDUPTHER

## 2023-04-13 DIAGNOSIS — E78.00 HYPERCHOLESTEREMIA: ICD-10-CM

## 2023-04-13 RX ORDER — ROSUVASTATIN CALCIUM 40 MG/1
40 TABLET, COATED ORAL DAILY
Qty: 100 TABLET | Refills: 1 | Status: SHIPPED | OUTPATIENT
Start: 2023-04-13 | End: 2024-02-06 | Stop reason: SDUPTHER

## 2023-04-13 NOTE — TELEPHONE ENCOUNTER
Received request via: Pharmacy    Was the patient seen in the last year in this department? Yes    Does the patient have an active prescription (recently filled or refills available) for medication(s) requested?  Yes but would like sent to new pharmacy    Does the patient have snf Plus and need 100 day supply (blood pressure, diabetes and cholesterol meds only)? Yes, quantity updated to 100 days

## 2023-04-24 ENCOUNTER — OFFICE VISIT (OUTPATIENT)
Dept: MEDICAL GROUP | Facility: PHYSICIAN GROUP | Age: 78
End: 2023-04-24
Payer: MEDICARE

## 2023-04-24 VITALS
OXYGEN SATURATION: 95 % | DIASTOLIC BLOOD PRESSURE: 70 MMHG | BODY MASS INDEX: 22.86 KG/M2 | HEART RATE: 91 BPM | WEIGHT: 137.2 LBS | HEIGHT: 65 IN | SYSTOLIC BLOOD PRESSURE: 128 MMHG | RESPIRATION RATE: 16 BRPM | TEMPERATURE: 98.7 F

## 2023-04-24 DIAGNOSIS — R41.3 MEMORY DEFICIT: ICD-10-CM

## 2023-04-24 PROCEDURE — 99213 OFFICE O/P EST LOW 20 MIN: CPT | Performed by: STUDENT IN AN ORGANIZED HEALTH CARE EDUCATION/TRAINING PROGRAM

## 2023-04-24 NOTE — PROGRESS NOTES
"CC:  The encounter diagnosis was Memory deficit.    HISTORY OF THE PRESENT ILLNESS: Patient is a 77 y.o. female. This pleasant patient is here today to discuss:    1. Memory deficit  Over the last 2 weeks, the patient feels that her memory is not quite as good as it used to.  She ordered some items to the mail when the package arrives she could not remember what it was that she had ordered.  She does admit to increased stress lately secondary to taking care of her significant other.  She gets 8 hours of sleep although feels that this has been quite restless recently.  She tosses and turns a lot and is unable to get comfortable through the night.    No difficulty falling asleep.    Active Diagnosis:    Patient Active Problem List   Diagnosis    Hypercholesteremia    Type 2 diabetes mellitus with microalbuminuria, without long-term current use of insulin ([facility])    Eczema of both hands    Hypertension associated with diabetes ([facility])    POP-Q stage 2 cystocele    Encounter for fitting and adjustment of pessary    Vulvovaginitis    Overflow diarrhea    Osteopenia after menopause    Left hand pain    Stage 2 chronic kidney disease    Vaginal erosion secondary to pessary use ([facility])    Memory deficit      Current Outpatient Medications Ordered in Epic   Medication Sig Dispense Refill    rosuvastatin (CRESTOR) 40 MG tablet TAKE 1 TABLET BY MOUTH EVERY  Tablet 1    Coenzyme Q10 (COQ-10) 50 MG Cap Take 50 mg by mouth every day. 90 Capsule 3    estrogens, conjugated (PREMARIN) 0.625 MG/GM Cream Insert 0.5 g into the vagina two times a week. 30 g 6    losartan (COZAAR) 25 MG Tab Take 0.5 Tablets by mouth every day. 50 Tablet 3     No current Epic-ordered facility-administered medications on file.     ROS:   See HPI.    Objective:     Exam: /70 (BP Location: Left arm, Patient Position: Sitting, BP Cuff Size: Adult)   Pulse 91   Temp 37.1 °C (98.7 °F) (Temporal)   Resp 16   Ht 1.651 m (5' 5\")   Wt 62.2 kg (137 lb " 3.2 oz)   SpO2 95%  Body mass index is 22.83 kg/m².    General: Normal appearing. No distress.  neurologic: Grossly nonfocal.  CN II through XII intact.  Skin: Warm and dry.  No obvious lesions.  Musculoskeletal: No extremity cyanosis, clubbing, or edema.  Psych: Normal mood and affect.  Fluid conversation with bronchial capillary.  Mini cog exam: Patient remembers all 3 words.  Failed clock.            Assessment & Plan:   77 y.o. female with the following -    Labs:   10/31/2022:  -BMP without renal dysfunction.    1/9/2023:  -LFTs without hepatic dysfunction.    1. Memory deficit  -Undiagnosed problem.  -Unclear if this represents cognitive decline versus an increased period of stress with poor sleep.  Patient has some aches and pains and may be interfering with her sleep.  -Trial with naproxen sodium 440 to 500 mg nightly.  -Patient to attempt a get at least an additional 30 minutes of bedtime per night.  -Consider more extensive work-up including referral to neurology memory clinic if the patient symptoms do not resolve in the next month or so.      Return in about 1 month (around 5/24/2023).    Please note that this dictation was created using voice recognition software. I have made every reasonable attempt to correct obvious errors, but I expect that there are errors of grammar and possibly content that I did not discover before finalizing the note.      Howard Younger PA-C 4/24/2023

## 2023-06-02 ENCOUNTER — PATIENT MESSAGE (OUTPATIENT)
Dept: HEALTH INFORMATION MANAGEMENT | Facility: OTHER | Age: 78
End: 2023-06-02

## 2023-06-02 ENCOUNTER — DOCUMENTATION (OUTPATIENT)
Dept: HEALTH INFORMATION MANAGEMENT | Facility: OTHER | Age: 78
End: 2023-06-02
Payer: MEDICARE

## 2023-06-10 DIAGNOSIS — I15.2 HYPERTENSION ASSOCIATED WITH DIABETES (HCC): ICD-10-CM

## 2023-06-10 DIAGNOSIS — E11.59 HYPERTENSION ASSOCIATED WITH DIABETES (HCC): ICD-10-CM

## 2023-06-12 PROCEDURE — RXMED WILLOW AMBULATORY MEDICATION CHARGE: Performed by: STUDENT IN AN ORGANIZED HEALTH CARE EDUCATION/TRAINING PROGRAM

## 2023-06-12 RX ORDER — LOSARTAN POTASSIUM 25 MG/1
12.5 TABLET ORAL DAILY
Qty: 100 TABLET | Refills: 1 | Status: SHIPPED | OUTPATIENT
Start: 2023-06-12

## 2023-06-13 ENCOUNTER — PHARMACY VISIT (OUTPATIENT)
Dept: PHARMACY | Facility: MEDICAL CENTER | Age: 78
End: 2023-06-13
Payer: COMMERCIAL

## 2023-07-18 PROCEDURE — RXMED WILLOW AMBULATORY MEDICATION CHARGE: Performed by: STUDENT IN AN ORGANIZED HEALTH CARE EDUCATION/TRAINING PROGRAM

## 2023-07-20 ENCOUNTER — PHARMACY VISIT (OUTPATIENT)
Dept: PHARMACY | Facility: MEDICAL CENTER | Age: 78
End: 2023-07-20
Payer: COMMERCIAL

## 2023-08-16 ENCOUNTER — DOCUMENTATION (OUTPATIENT)
Dept: HEALTH INFORMATION MANAGEMENT | Facility: OTHER | Age: 78
End: 2023-08-16
Payer: MEDICARE

## 2023-09-18 ENCOUNTER — HOSPITAL ENCOUNTER (OUTPATIENT)
Dept: LAB | Facility: MEDICAL CENTER | Age: 78
End: 2023-09-18
Attending: STUDENT IN AN ORGANIZED HEALTH CARE EDUCATION/TRAINING PROGRAM
Payer: MEDICARE

## 2023-09-18 DIAGNOSIS — E78.00 HYPERCHOLESTEREMIA: ICD-10-CM

## 2023-09-18 DIAGNOSIS — N18.2 STAGE 2 CHRONIC KIDNEY DISEASE: ICD-10-CM

## 2023-09-18 PROCEDURE — 36415 COLL VENOUS BLD VENIPUNCTURE: CPT

## 2023-09-18 PROCEDURE — 80061 LIPID PANEL: CPT

## 2023-09-18 PROCEDURE — 80053 COMPREHEN METABOLIC PANEL: CPT

## 2023-09-19 LAB
ALBUMIN SERPL BCP-MCNC: 4.2 G/DL (ref 3.2–4.9)
ALBUMIN/GLOB SERPL: 1.8 G/DL
ALP SERPL-CCNC: 74 U/L (ref 30–99)
ALT SERPL-CCNC: 24 U/L (ref 2–50)
ANION GAP SERPL CALC-SCNC: 9 MMOL/L (ref 7–16)
AST SERPL-CCNC: 19 U/L (ref 12–45)
BILIRUB SERPL-MCNC: 0.3 MG/DL (ref 0.1–1.5)
BUN SERPL-MCNC: 12 MG/DL (ref 8–22)
CALCIUM ALBUM COR SERPL-MCNC: 8.8 MG/DL (ref 8.5–10.5)
CALCIUM SERPL-MCNC: 9 MG/DL (ref 8.5–10.5)
CHLORIDE SERPL-SCNC: 104 MMOL/L (ref 96–112)
CHOLEST SERPL-MCNC: 139 MG/DL (ref 100–199)
CO2 SERPL-SCNC: 27 MMOL/L (ref 20–33)
CREAT SERPL-MCNC: 0.76 MG/DL (ref 0.5–1.4)
FASTING STATUS PATIENT QL REPORTED: NORMAL
GFR SERPLBLD CREATININE-BSD FMLA CKD-EPI: 79 ML/MIN/1.73 M 2
GLOBULIN SER CALC-MCNC: 2.3 G/DL (ref 1.9–3.5)
GLUCOSE SERPL-MCNC: 93 MG/DL (ref 65–99)
HDLC SERPL-MCNC: 65 MG/DL
LDLC SERPL CALC-MCNC: 63 MG/DL
POTASSIUM SERPL-SCNC: 4.8 MMOL/L (ref 3.6–5.5)
PROT SERPL-MCNC: 6.5 G/DL (ref 6–8.2)
SODIUM SERPL-SCNC: 140 MMOL/L (ref 135–145)
TRIGL SERPL-MCNC: 54 MG/DL (ref 0–149)

## 2023-09-19 PROCEDURE — RXMED WILLOW AMBULATORY MEDICATION CHARGE: Performed by: STUDENT IN AN ORGANIZED HEALTH CARE EDUCATION/TRAINING PROGRAM

## 2023-09-19 SDOH — ECONOMIC STABILITY: TRANSPORTATION INSECURITY
IN THE PAST 12 MONTHS, HAS LACK OF TRANSPORTATION KEPT YOU FROM MEETINGS, WORK, OR FROM GETTING THINGS NEEDED FOR DAILY LIVING?: NO

## 2023-09-19 SDOH — ECONOMIC STABILITY: FOOD INSECURITY: WITHIN THE PAST 12 MONTHS, THE FOOD YOU BOUGHT JUST DIDN'T LAST AND YOU DIDN'T HAVE MONEY TO GET MORE.: NEVER TRUE

## 2023-09-19 SDOH — HEALTH STABILITY: PHYSICAL HEALTH: ON AVERAGE, HOW MANY MINUTES DO YOU ENGAGE IN EXERCISE AT THIS LEVEL?: 30 MIN

## 2023-09-19 SDOH — ECONOMIC STABILITY: TRANSPORTATION INSECURITY
IN THE PAST 12 MONTHS, HAS THE LACK OF TRANSPORTATION KEPT YOU FROM MEDICAL APPOINTMENTS OR FROM GETTING MEDICATIONS?: NO

## 2023-09-19 SDOH — ECONOMIC STABILITY: HOUSING INSECURITY
IN THE LAST 12 MONTHS, WAS THERE A TIME WHEN YOU DID NOT HAVE A STEADY PLACE TO SLEEP OR SLEPT IN A SHELTER (INCLUDING NOW)?: NO

## 2023-09-19 SDOH — ECONOMIC STABILITY: HOUSING INSECURITY: IN THE LAST 12 MONTHS, HOW MANY PLACES HAVE YOU LIVED?: 1

## 2023-09-19 SDOH — ECONOMIC STABILITY: FOOD INSECURITY: WITHIN THE PAST 12 MONTHS, YOU WORRIED THAT YOUR FOOD WOULD RUN OUT BEFORE YOU GOT MONEY TO BUY MORE.: NEVER TRUE

## 2023-09-19 SDOH — ECONOMIC STABILITY: INCOME INSECURITY: IN THE LAST 12 MONTHS, WAS THERE A TIME WHEN YOU WERE NOT ABLE TO PAY THE MORTGAGE OR RENT ON TIME?: NO

## 2023-09-19 SDOH — ECONOMIC STABILITY: INCOME INSECURITY: HOW HARD IS IT FOR YOU TO PAY FOR THE VERY BASICS LIKE FOOD, HOUSING, MEDICAL CARE, AND HEATING?: NOT HARD AT ALL

## 2023-09-19 SDOH — HEALTH STABILITY: PHYSICAL HEALTH: ON AVERAGE, HOW MANY DAYS PER WEEK DO YOU ENGAGE IN MODERATE TO STRENUOUS EXERCISE (LIKE A BRISK WALK)?: 4 DAYS

## 2023-09-19 SDOH — HEALTH STABILITY: MENTAL HEALTH
STRESS IS WHEN SOMEONE FEELS TENSE, NERVOUS, ANXIOUS, OR CAN'T SLEEP AT NIGHT BECAUSE THEIR MIND IS TROUBLED. HOW STRESSED ARE YOU?: NOT AT ALL

## 2023-09-19 SDOH — ECONOMIC STABILITY: TRANSPORTATION INSECURITY
IN THE PAST 12 MONTHS, HAS LACK OF RELIABLE TRANSPORTATION KEPT YOU FROM MEDICAL APPOINTMENTS, MEETINGS, WORK OR FROM GETTING THINGS NEEDED FOR DAILY LIVING?: NO

## 2023-09-19 ASSESSMENT — SOCIAL DETERMINANTS OF HEALTH (SDOH)
HOW OFTEN DO YOU HAVE A DRINK CONTAINING ALCOHOL: NEVER
DO YOU BELONG TO ANY CLUBS OR ORGANIZATIONS SUCH AS CHURCH GROUPS UNIONS, FRATERNAL OR ATHLETIC GROUPS, OR SCHOOL GROUPS?: YES
HOW OFTEN DO YOU ATTEND CHURCH OR RELIGIOUS SERVICES?: 1 TO 4 TIMES PER YEAR
IN A TYPICAL WEEK, HOW MANY TIMES DO YOU TALK ON THE PHONE WITH FAMILY, FRIENDS, OR NEIGHBORS?: MORE THAN THREE TIMES A WEEK
HOW OFTEN DO YOU GET TOGETHER WITH FRIENDS OR RELATIVES?: MORE THAN THREE TIMES A WEEK
HOW OFTEN DO YOU HAVE SIX OR MORE DRINKS ON ONE OCCASION: NEVER
HOW OFTEN DO YOU ATTENT MEETINGS OF THE CLUB OR ORGANIZATION YOU BELONG TO?: MORE THAN 4 TIMES PER YEAR
HOW HARD IS IT FOR YOU TO PAY FOR THE VERY BASICS LIKE FOOD, HOUSING, MEDICAL CARE, AND HEATING?: NOT HARD AT ALL
WITHIN THE PAST 12 MONTHS, YOU WORRIED THAT YOUR FOOD WOULD RUN OUT BEFORE YOU GOT THE MONEY TO BUY MORE: NEVER TRUE
HOW OFTEN DO YOU ATTEND CHURCH OR RELIGIOUS SERVICES?: 1 TO 4 TIMES PER YEAR
HOW OFTEN DO YOU ATTENT MEETINGS OF THE CLUB OR ORGANIZATION YOU BELONG TO?: MORE THAN 4 TIMES PER YEAR
IN A TYPICAL WEEK, HOW MANY TIMES DO YOU TALK ON THE PHONE WITH FAMILY, FRIENDS, OR NEIGHBORS?: MORE THAN THREE TIMES A WEEK
HOW OFTEN DO YOU GET TOGETHER WITH FRIENDS OR RELATIVES?: MORE THAN THREE TIMES A WEEK
DO YOU BELONG TO ANY CLUBS OR ORGANIZATIONS SUCH AS CHURCH GROUPS UNIONS, FRATERNAL OR ATHLETIC GROUPS, OR SCHOOL GROUPS?: YES
HOW MANY DRINKS CONTAINING ALCOHOL DO YOU HAVE ON A TYPICAL DAY WHEN YOU ARE DRINKING: PATIENT DOES NOT DRINK

## 2023-09-19 ASSESSMENT — LIFESTYLE VARIABLES
HOW MANY STANDARD DRINKS CONTAINING ALCOHOL DO YOU HAVE ON A TYPICAL DAY: PATIENT DOES NOT DRINK
HOW OFTEN DO YOU HAVE SIX OR MORE DRINKS ON ONE OCCASION: NEVER
HOW OFTEN DO YOU HAVE A DRINK CONTAINING ALCOHOL: NEVER
AUDIT-C TOTAL SCORE: 0
SKIP TO QUESTIONS 9-10: 1

## 2023-09-20 ENCOUNTER — PHARMACY VISIT (OUTPATIENT)
Dept: PHARMACY | Facility: MEDICAL CENTER | Age: 78
End: 2023-09-20
Payer: COMMERCIAL

## 2023-09-22 ENCOUNTER — APPOINTMENT (OUTPATIENT)
Dept: MEDICAL GROUP | Facility: MEDICAL CENTER | Age: 78
End: 2023-09-22
Payer: MEDICARE

## 2023-09-27 ENCOUNTER — PHARMACY VISIT (OUTPATIENT)
Dept: PHARMACY | Facility: MEDICAL CENTER | Age: 78
End: 2023-09-27
Payer: COMMERCIAL

## 2023-09-27 PROCEDURE — RXMED WILLOW AMBULATORY MEDICATION CHARGE: Performed by: INTERNAL MEDICINE

## 2023-09-27 RX ORDER — INFLUENZA A VIRUS A/MICHIGAN/45/2015 X-275 (H1N1) ANTIGEN (FORMALDEHYDE INACTIVATED), INFLUENZA A VIRUS A/SINGAPORE/INFIMH-16-0019/2016 IVR-186 (H3N2) ANTIGEN (FORMALDEHYDE INACTIVATED), INFLUENZA B VIRUS B/PHUKET/3073/2013 ANTIGEN (FORMALDEHYDE INACTIVATED), AND INFLUENZA B VIRUS B/MARYLAND/15/2016 BX-69A ANTIGEN (FORMALDEHYDE INACTIVATED) 60; 60; 60; 60 UG/.7ML; UG/.7ML; UG/.7ML; UG/.7ML
INJECTION, SUSPENSION INTRAMUSCULAR
Qty: 0.7 ML | Refills: 0 | OUTPATIENT
Start: 2023-09-27

## 2023-10-21 PROCEDURE — RXMED WILLOW AMBULATORY MEDICATION CHARGE: Performed by: STUDENT IN AN ORGANIZED HEALTH CARE EDUCATION/TRAINING PROGRAM

## 2023-10-27 ENCOUNTER — PHARMACY VISIT (OUTPATIENT)
Dept: PHARMACY | Facility: MEDICAL CENTER | Age: 78
End: 2023-10-27
Payer: COMMERCIAL

## 2023-11-16 ENCOUNTER — OFFICE VISIT (OUTPATIENT)
Dept: MEDICAL GROUP | Facility: PHYSICIAN GROUP | Age: 78
End: 2023-11-16
Payer: MEDICARE

## 2023-11-16 ENCOUNTER — HOSPITAL ENCOUNTER (OUTPATIENT)
Facility: MEDICAL CENTER | Age: 78
End: 2023-11-16
Payer: MEDICARE

## 2023-11-16 VITALS
OXYGEN SATURATION: 94 % | HEART RATE: 67 BPM | RESPIRATION RATE: 20 BRPM | DIASTOLIC BLOOD PRESSURE: 60 MMHG | WEIGHT: 135 LBS | SYSTOLIC BLOOD PRESSURE: 108 MMHG | HEIGHT: 65 IN | BODY MASS INDEX: 22.49 KG/M2 | TEMPERATURE: 98.2 F

## 2023-11-16 DIAGNOSIS — E11.69 HYPERLIPIDEMIA ASSOCIATED WITH TYPE 2 DIABETES MELLITUS (HCC): ICD-10-CM

## 2023-11-16 DIAGNOSIS — T83.89XD VAGINAL EROSION SECONDARY TO PESSARY USE, SUBSEQUENT ENCOUNTER: ICD-10-CM

## 2023-11-16 DIAGNOSIS — M85.80 OSTEOPENIA AFTER MENOPAUSE: ICD-10-CM

## 2023-11-16 DIAGNOSIS — M79.642 LEFT HAND PAIN: ICD-10-CM

## 2023-11-16 DIAGNOSIS — L30.9 ECZEMA OF BOTH HANDS: ICD-10-CM

## 2023-11-16 DIAGNOSIS — E11.29 TYPE 2 DIABETES MELLITUS WITH MICROALBUMINURIA, WITHOUT LONG-TERM CURRENT USE OF INSULIN (HCC): ICD-10-CM

## 2023-11-16 DIAGNOSIS — I15.2 HYPERTENSION ASSOCIATED WITH DIABETES (HCC): ICD-10-CM

## 2023-11-16 DIAGNOSIS — E78.5 HYPERLIPIDEMIA ASSOCIATED WITH TYPE 2 DIABETES MELLITUS (HCC): ICD-10-CM

## 2023-11-16 DIAGNOSIS — Z76.89 ENCOUNTER TO ESTABLISH CARE: ICD-10-CM

## 2023-11-16 DIAGNOSIS — N89.8 VAGINAL EROSION SECONDARY TO PESSARY USE, SUBSEQUENT ENCOUNTER: ICD-10-CM

## 2023-11-16 DIAGNOSIS — E11.59 HYPERTENSION ASSOCIATED WITH DIABETES (HCC): ICD-10-CM

## 2023-11-16 DIAGNOSIS — Z78.0 OSTEOPENIA AFTER MENOPAUSE: ICD-10-CM

## 2023-11-16 DIAGNOSIS — R80.9 TYPE 2 DIABETES MELLITUS WITH MICROALBUMINURIA, WITHOUT LONG-TERM CURRENT USE OF INSULIN (HCC): ICD-10-CM

## 2023-11-16 DIAGNOSIS — Z87.828 HX OF LACERATION OF SKIN: ICD-10-CM

## 2023-11-16 DIAGNOSIS — Z23 NEED FOR VACCINATION: ICD-10-CM

## 2023-11-16 PROBLEM — N76.0 VULVOVAGINITIS: Status: RESOLVED | Noted: 2019-07-01 | Resolved: 2023-11-16

## 2023-11-16 LAB
CREAT UR-MCNC: 34.49 MG/DL
HBA1C MFR BLD: 6.8 % (ref ?–5.8)
MICROALBUMIN UR-MCNC: <1.2 MG/DL
MICROALBUMIN/CREAT UR: NORMAL MG/G (ref 0–30)
POCT INT CON NEG: NEGATIVE
POCT INT CON POS: POSITIVE

## 2023-11-16 PROCEDURE — 83036 HEMOGLOBIN GLYCOSYLATED A1C: CPT

## 2023-11-16 PROCEDURE — 3074F SYST BP LT 130 MM HG: CPT

## 2023-11-16 PROCEDURE — 99214 OFFICE O/P EST MOD 30 MIN: CPT | Mod: 25

## 2023-11-16 PROCEDURE — 82570 ASSAY OF URINE CREATININE: CPT

## 2023-11-16 PROCEDURE — RXMED WILLOW AMBULATORY MEDICATION CHARGE

## 2023-11-16 PROCEDURE — 3078F DIAST BP <80 MM HG: CPT

## 2023-11-16 PROCEDURE — 90471 IMMUNIZATION ADMIN: CPT

## 2023-11-16 PROCEDURE — 90715 TDAP VACCINE 7 YRS/> IM: CPT

## 2023-11-16 PROCEDURE — 82043 UR ALBUMIN QUANTITATIVE: CPT

## 2023-11-16 RX ORDER — CONJUGATED ESTROGENS 0.62 MG/G
0.5 CREAM VAGINAL
Qty: 30 G | Refills: 6 | Status: SHIPPED | OUTPATIENT
Start: 2023-11-16

## 2023-11-16 RX ORDER — VITAMIN B COMPLEX
1000 TABLET ORAL DAILY
COMMUNITY

## 2023-11-16 NOTE — ASSESSMENT & PLAN NOTE
Chronic, stable.  We will repeat POCT A1c, UACR, monofilament today.  Continue healthy lifestyle recommendations

## 2023-11-16 NOTE — ASSESSMENT & PLAN NOTE
Chronic, stable. Taking calcium and vit D3 daily for this.   Last Dexa 3/26/2021 IMPRESSION:   According to the World Health Organization classification, bone mineral density of this patient is osteopenia with increased risk of fracture in the lumbar spine and osteopenia with increased risk of fracture in the left femur.   10-year Probability of Fracture:  Major Osteoporotic     13.2%  Hip     3.5%

## 2023-11-16 NOTE — PROGRESS NOTES
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  Pt presents today to establish care with me, prior PCP Carisa.  She is accompanied by her spouse for today's visit.    They like to bowl. Has family in the area.    HPI:   Alejandra presents today with    Problem   Vaginal Erosion Secondary to Pessary Use (Hcc)   Left Hand Pain   Osteopenia After Menopause   Encounter for Fitting and Adjustment of Pessary   Hypertension Associated With Diabetes (Hcc)   Eczema of Both Hands   Hyperlipidemia Associated With Type 2 Diabetes Mellitus (Hcc)   Type 2 Diabetes Mellitus With Microalbuminuria, Without Long-Term Current Use of Insulin (Hcc)    This is a chronic condition. Diet controlled   Current medications:  Insulin:   Biguanide:   GLP1-RA:    SGLT-2i:      DPP4-I:   TZD:   Teresa:  Sulfonyluria:     Last A1c: 6.8% 11/16/23   6.3% 3/9/23  Last Microalb/Cr ratio: 11/16/23     ;10/31/22 47  Fasting sugars:  Last diabetic foot exam: 11/16/23  Last retinal eye exam: Seeing optometry last visit 5/2023; will request record   ACEi/ARB? Losartan 12.5 mg daily  Statin?  Rosuvastatin 40 mg daily.  Aspirin?  Consider  Concomitant HTN?  N/A  Nightly foot checks?  Encouraged         Vulvovaginitis (Resolved)       HROS:  Review of Systems   All other systems reviewed and are negative.      Objective:     Exam:  /60 (BP Location: Right arm, Patient Position: Sitting, BP Cuff Size: Adult)   Pulse 67   Temp 36.8 °C (98.2 °F) (Temporal)   Resp 20   Ht 1.651 m (5' 5\")   Wt 61.2 kg (135 lb)   LMP  (LMP Unknown)   SpO2 " 94%   BMI 22.47 kg/m²  Body mass index is 22.47 kg/m².    Physical Exam  Vitals reviewed.   Constitutional:       General: She is not in acute distress.     Appearance: Normal appearance. She is not ill-appearing.   HENT:      Head: Normocephalic and atraumatic.   Cardiovascular:      Rate and Rhythm: Normal rate and regular rhythm.      Pulses: Normal pulses.           Dorsalis pedis pulses are 2+ on the right side and 2+ on the left side.      Heart sounds: Normal heart sounds. No murmur heard.  Pulmonary:      Effort: Pulmonary effort is normal. No respiratory distress.      Breath sounds: Normal breath sounds. No wheezing.   Abdominal:      General: Bowel sounds are normal.      Palpations: Abdomen is soft.   Feet:      Right foot:      Protective Sensation: 10 sites tested.  10 sites sensed.      Skin integrity: Skin integrity normal.      Toenail Condition: Right toenails are normal.      Left foot:      Protective Sensation: 10 sites tested.  10 sites sensed.      Skin integrity: Skin integrity normal.      Toenail Condition: Left toenails are normal.      Comments: Monofilament testing with a 10 gram force: sensation intact: intact bilaterally  Visual Inspection: Feet without maceration, ulcers, fissures.  Pedal pulses: intact bilaterally    Skin:     General: Skin is warm and dry.      Findings: No rash.   Neurological:      General: No focal deficit present.      Mental Status: She is alert and oriented to person, place, and time.   Psychiatric:         Mood and Affect: Mood normal.         Behavior: Behavior normal.         Labs:    Latest Reference Range & Units 03/09/23 09:48 09/18/23 14:50   Sodium 135 - 145 mmol/L  140   Potassium 3.6 - 5.5 mmol/L  4.8   Chloride 96 - 112 mmol/L  104   Co2 20 - 33 mmol/L  27   Anion Gap 7.0 - 16.0   9.0   Glucose 65 - 99 mg/dL  93   Bun 8 - 22 mg/dL  12   Creatinine 0.50 - 1.40 mg/dL  0.76   GFR (CKD-EPI) >60 mL/min/1.73 m 2  79   Calcium 8.5 - 10.5 mg/dL  9.0    Correct Calcium 8.5 - 10.5 mg/dL  8.8   AST(SGOT) 12 - 45 U/L  19   ALT(SGPT) 2 - 50 U/L  24   Alkaline Phosphatase 30 - 99 U/L  74   Total Bilirubin 0.1 - 1.5 mg/dL  0.3   Albumin 3.2 - 4.9 g/dL  4.2   Total Protein 6.0 - 8.2 g/dL  6.5   Globulin 1.9 - 3.5 g/dL  2.3   A-G Ratio g/dL  1.8   Glycohemoglobin 5.8 % 6.3 !    Fasting Status   Fasting   Cholesterol,Tot 100 - 199 mg/dL  139   Triglycerides 0 - 149 mg/dL  54   HDL >=40 mg/dL  65   LDL <100 mg/dL  63   !: Data is abnormal    Assessment & Plan:     78 y.o. female with the following -     Problem List Items Addressed This Visit       Hyperlipidemia associated with type 2 diabetes mellitus (HCC)     Chronic, stable.  Continue rosuvastatin 40 mg daily.         Type 2 diabetes mellitus with microalbuminuria, without long-term current use of insulin (HCC)     Chronic, stable.  We will repeat POCT A1c, UACR, monofilament today.  Continue healthy lifestyle recommendations         Relevant Orders    POCT A1C (Completed)    Microalbumin Creat Ratio Urine - Clinic Collect    Diabetic Monofilament Lower Extremity Exam (Completed)    Eczema of both hands     Chronic, stable.  Using Lidex cream 0.05% as needed; continue this         Hypertension associated with diabetes (HCC)     Chronic, stable.  Continue losartan 12.5 mg daily         Osteopenia after menopause     Chronic, stable. Taking calcium and vit D3 daily for this.   Last Dexa 3/26/2021 IMPRESSION:   According to the World Health Organization classification, bone mineral density of this patient is osteopenia with increased risk of fracture in the lumbar spine and osteopenia with increased risk of fracture in the left femur.   10-year Probability of Fracture:  Major Osteoporotic     13.2%  Hip     3.5%         Left hand pain     Chronic, ongoing.  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 Recommend topical nsaid and analgesics.           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  Continue premarin twice weekly.         Relevant Medications    estrogens, conjugated (PREMARIN) 0.625 MG/GM Cream     Other Visit Diagnoses       Encounter to establish care        Need for vaccination        Relevant Orders    Tdap =>8yo IM (Completed)    Hx of laceration of skin        Relevant Orders    Tdap =>8yo IM (Completed)          Patient was educated in proper administration of medication(s) ordered today including safety, possible SE, risks, benefits, rationale and alternatives to therapy.   Supportive care, differential diagnoses, and indications for immediate follow-up discussed with patient.    Pathogenesis of diagnosis discussed including typical length and natural progression.    Instructed to return to clinic or nearest emergency department for any change in condition, further concerns, or worsening of symptoms.  Patient states understanding of the plan of care and discharge instructions.    Return in about 6 months (around 5/16/2024) for Diabetes.    Please note that this dictation was created using voice recognition software. I have made every reasonable attempt to correct obvious errors, but I expect that there are errors of grammar and possibly content that I did not discover before finalizing the note.

## 2023-11-17 ENCOUNTER — PHARMACY VISIT (OUTPATIENT)
Dept: PHARMACY | Facility: MEDICAL CENTER | Age: 78
End: 2023-11-17
Payer: COMMERCIAL

## 2023-12-23 PROCEDURE — RXMED WILLOW AMBULATORY MEDICATION CHARGE: Performed by: STUDENT IN AN ORGANIZED HEALTH CARE EDUCATION/TRAINING PROGRAM

## 2023-12-27 ENCOUNTER — PHARMACY VISIT (OUTPATIENT)
Dept: PHARMACY | Facility: MEDICAL CENTER | Age: 78
End: 2023-12-27
Payer: COMMERCIAL

## 2024-01-04 ENCOUNTER — TELEPHONE (OUTPATIENT)
Dept: MEDICAL GROUP | Facility: PHYSICIAN GROUP | Age: 79
End: 2024-01-04
Payer: MEDICARE

## 2024-02-01 DIAGNOSIS — E55.9 VITAMIN D DEFICIENCY: ICD-10-CM

## 2024-02-01 DIAGNOSIS — E78.5 HYPERLIPIDEMIA ASSOCIATED WITH TYPE 2 DIABETES MELLITUS (HCC): ICD-10-CM

## 2024-02-01 DIAGNOSIS — E11.69 HYPERLIPIDEMIA ASSOCIATED WITH TYPE 2 DIABETES MELLITUS (HCC): ICD-10-CM

## 2024-02-01 DIAGNOSIS — Z13.29 THYROID DISORDER SCREEN: ICD-10-CM

## 2024-02-01 DIAGNOSIS — Z13.0 SCREENING FOR DEFICIENCY ANEMIA: ICD-10-CM

## 2024-02-06 DIAGNOSIS — E78.00 HYPERCHOLESTEREMIA: ICD-10-CM

## 2024-02-07 NOTE — TELEPHONE ENCOUNTER
Received request via: Pharmacy    Was the patient seen in the last year in this department? Yes    Does the patient have an active prescription (recently filled or refills available) for medication(s) requested? No    Pharmacy Name:    Does the patient have [facility] and need 100 day supply (blood pressure, diabetes and cholesterol meds only)? Yes, quantity updated to 100 days

## 2024-02-09 PROCEDURE — RXMED WILLOW AMBULATORY MEDICATION CHARGE

## 2024-02-09 RX ORDER — ROSUVASTATIN CALCIUM 40 MG/1
40 TABLET, COATED ORAL DAILY
Qty: 100 TABLET | Refills: 3 | Status: SHIPPED | OUTPATIENT
Start: 2024-02-09

## 2024-02-12 ENCOUNTER — PHARMACY VISIT (OUTPATIENT)
Dept: PHARMACY | Facility: MEDICAL CENTER | Age: 79
End: 2024-02-12
Payer: COMMERCIAL

## 2024-03-12 PROCEDURE — RXMED WILLOW AMBULATORY MEDICATION CHARGE: Performed by: OBSTETRICS & GYNECOLOGY

## 2024-03-14 ENCOUNTER — PHARMACY VISIT (OUTPATIENT)
Dept: PHARMACY | Facility: MEDICAL CENTER | Age: 79
End: 2024-03-14
Payer: COMMERCIAL

## 2024-03-25 ENCOUNTER — HOSPITAL ENCOUNTER (OUTPATIENT)
Dept: LAB | Facility: MEDICAL CENTER | Age: 79
End: 2024-03-25
Payer: MEDICARE

## 2024-03-25 DIAGNOSIS — Z13.0 SCREENING FOR DEFICIENCY ANEMIA: ICD-10-CM

## 2024-03-25 DIAGNOSIS — E55.9 VITAMIN D DEFICIENCY: ICD-10-CM

## 2024-03-25 DIAGNOSIS — Z13.29 THYROID DISORDER SCREEN: ICD-10-CM

## 2024-03-25 LAB
25(OH)D3 SERPL-MCNC: 30 NG/ML (ref 30–100)
ERYTHROCYTE [DISTWIDTH] IN BLOOD BY AUTOMATED COUNT: 47.2 FL (ref 35.9–50)
HCT VFR BLD AUTO: 42.1 % (ref 37–47)
HGB BLD-MCNC: 13.8 G/DL (ref 12–16)
MCH RBC QN AUTO: 29.4 PG (ref 27–33)
MCHC RBC AUTO-ENTMCNC: 32.8 G/DL (ref 32.2–35.5)
MCV RBC AUTO: 89.8 FL (ref 81.4–97.8)
PLATELET # BLD AUTO: 380 K/UL (ref 164–446)
PMV BLD AUTO: 9.6 FL (ref 9–12.9)
RBC # BLD AUTO: 4.69 M/UL (ref 4.2–5.4)
TSH SERPL DL<=0.005 MIU/L-ACNC: 1.64 UIU/ML (ref 0.38–5.33)
WBC # BLD AUTO: 7.8 K/UL (ref 4.8–10.8)

## 2024-03-25 PROCEDURE — 85027 COMPLETE CBC AUTOMATED: CPT

## 2024-03-25 PROCEDURE — 36415 COLL VENOUS BLD VENIPUNCTURE: CPT

## 2024-03-25 PROCEDURE — 82306 VITAMIN D 25 HYDROXY: CPT

## 2024-03-25 PROCEDURE — 84443 ASSAY THYROID STIM HORMONE: CPT

## 2024-04-01 ENCOUNTER — APPOINTMENT (OUTPATIENT)
Dept: MEDICAL GROUP | Facility: PHYSICIAN GROUP | Age: 79
End: 2024-04-01
Payer: MEDICARE

## 2024-04-01 ENCOUNTER — TELEPHONE (OUTPATIENT)
Dept: HEALTH INFORMATION MANAGEMENT | Facility: OTHER | Age: 79
End: 2024-04-01

## 2024-04-01 VITALS
HEART RATE: 111 BPM | TEMPERATURE: 98.8 F | WEIGHT: 138.2 LBS | BODY MASS INDEX: 23.03 KG/M2 | HEIGHT: 65 IN | SYSTOLIC BLOOD PRESSURE: 118 MMHG | DIASTOLIC BLOOD PRESSURE: 68 MMHG | OXYGEN SATURATION: 94 % | RESPIRATION RATE: 20 BRPM

## 2024-04-01 DIAGNOSIS — E11.59 HYPERTENSION ASSOCIATED WITH DIABETES (HCC): ICD-10-CM

## 2024-04-01 DIAGNOSIS — R80.9 TYPE 2 DIABETES MELLITUS WITH MICROALBUMINURIA, WITHOUT LONG-TERM CURRENT USE OF INSULIN (HCC): ICD-10-CM

## 2024-04-01 DIAGNOSIS — E78.5 HYPERLIPIDEMIA ASSOCIATED WITH TYPE 2 DIABETES MELLITUS (HCC): ICD-10-CM

## 2024-04-01 DIAGNOSIS — Z96.0 PRESENCE OF PESSARY: ICD-10-CM

## 2024-04-01 DIAGNOSIS — I15.2 HYPERTENSION ASSOCIATED WITH DIABETES (HCC): ICD-10-CM

## 2024-04-01 DIAGNOSIS — Z78.0 POSTMENOPAUSAL: ICD-10-CM

## 2024-04-01 DIAGNOSIS — E11.69 HYPERLIPIDEMIA ASSOCIATED WITH TYPE 2 DIABETES MELLITUS (HCC): ICD-10-CM

## 2024-04-01 DIAGNOSIS — E11.29 TYPE 2 DIABETES MELLITUS WITH MICROALBUMINURIA, WITHOUT LONG-TERM CURRENT USE OF INSULIN (HCC): ICD-10-CM

## 2024-04-01 PROCEDURE — 3074F SYST BP LT 130 MM HG: CPT

## 2024-04-01 PROCEDURE — 99214 OFFICE O/P EST MOD 30 MIN: CPT

## 2024-04-01 PROCEDURE — 3078F DIAST BP <80 MM HG: CPT

## 2024-04-01 PROCEDURE — RXMED WILLOW AMBULATORY MEDICATION CHARGE

## 2024-04-01 RX ORDER — LOSARTAN POTASSIUM 25 MG/1
12.5 TABLET ORAL DAILY
Qty: 100 TABLET | Refills: 1 | Status: SHIPPED | OUTPATIENT
Start: 2024-04-01

## 2024-04-01 ASSESSMENT — PATIENT HEALTH QUESTIONNAIRE - PHQ9: CLINICAL INTERPRETATION OF PHQ2 SCORE: 0

## 2024-04-01 ASSESSMENT — FIBROSIS 4 INDEX: FIB4 SCORE: 0.81

## 2024-04-01 NOTE — ASSESSMENT & PLAN NOTE
Chronic, stable. Continue healthy lifestyle recommendations. Will check A1c today; will request retinal screen from ophthalmology.

## 2024-04-01 NOTE — PROGRESS NOTES
Verbal consent was acquired by the patient to use [employer] ambient listening note generation during this visit     Subjective:     CC: Diagnoses of Type 2 diabetes mellitus with microalbuminuria, without long-term current use of insulin (HCC), Hypertension associated with diabetes (HCC), Hyperlipidemia associated with type 2 diabetes mellitus (HCC), Presence of pessary, and Postmenopausal were pertinent to this visit.    HPI:   Alejandra presents today with    History of Present Illness  The patient is a 78-year-old female who is here for lab follow-up.    The patient is currently recovering from a common cold, however, she reports an overall improvement in her condition. She adheres to a regimen of calcium and vitamin D3 supplements. She undergoes annual ophthalmological examinations, with her next appointment scheduled for 06/2024. Her diabetes is well-managed through dietary control. She has transitioned to a generic form of Premarin due to occasional bleeding caused by a pessary. She maintains regular gynecological follow-ups every 6 months. Her last annual wellness visit was conducted in 2023.    Problem   Presence of Pessary   Type 2 Diabetes Mellitus With Microalbuminuria, Without Long-Term Current Use of Insulin ([facility])    This is a chronic condition. Diet controlled   Current medications:  Insulin:   Biguanide:   GLP1-RA:    SGLT-2i:      DPP4-I:   TZD:   Teresa:  Sulfonyluria:     Last A1c: 6.8% 11/16/23   6.3% 3/9/23  Last Microalb/Cr ratio: 11/16/23     ;10/31/22 47  Fasting sugars:  Last diabetic foot exam: 11/16/23  Last retinal eye exam: Seeing optometry last visit 5/2023; will request record   ACEi/ARB? Losartan 12.5 mg daily  Statin?  Rosuvastatin 40 mg daily.  Aspirin?  Consider  Concomitant HTN?  N/A  Nightly foot checks?  Encouraged           ROS:  Review of Systems   All other systems reviewed and are negative.      Objective:     Exam:  /68 (BP Location: Left arm, Patient Position: Sitting, BP  "Cuff Size: Adult)   Pulse (!) 111   Temp 37.1 °C (98.8 °F) (Temporal)   Resp 20   Ht 1.651 m (5' 5\")   Wt 62.7 kg (138 lb 3.2 oz)   LMP  (LMP Unknown)   SpO2 94%   BMI 23.00 kg/m²  Body mass index is 23 kg/m².    Physical Exam  Vitals reviewed.   Constitutional:       General: She is not in acute distress.     Appearance: Normal appearance. She is not ill-appearing.   HENT:      Head: Normocephalic and atraumatic.   Cardiovascular:      Rate and Rhythm: Normal rate.      Pulses: Normal pulses.   Pulmonary:      Effort: Pulmonary effort is normal. No respiratory distress.   Skin:     General: Skin is warm and dry.      Findings: No rash.   Neurological:      General: No focal deficit present.      Mental Status: She is alert and oriented to person, place, and time.   Psychiatric:         Mood and Affect: Mood normal.         Behavior: Behavior normal.         Labs:    Latest Reference Range & Units 11/16/23 13:59 11/16/23 15:17 03/25/24 13:19   WBC 4.8 - 10.8 K/uL   7.8   RBC 4.20 - 5.40 M/uL   4.69   Hemoglobin 12.0 - 16.0 g/dL   13.8   Hematocrit 37.0 - 47.0 %   42.1   MCV 81.4 - 97.8 fL   89.8   MCH 27.0 - 33.0 pg   29.4   MCHC 32.2 - 35.5 g/dL   32.8   RDW 35.9 - 50.0 fL   47.2   Platelet Count 164 - 446 K/uL   380   MPV 9.0 - 12.9 fL   9.6   Glycohemoglobin 5.8 % 6.8 !     Micro Alb Creat Ratio 0 - 30 mg/g  see below    Creatinine, Urine mg/dL  34.49    Microalbumin, Urine Random mg/dL  <1.2    25-Hydroxy   Vitamin D 25 30 - 100 ng/mL   30   TSH 0.380 - 5.330 uIU/mL   1.640   !: Data is abnormal    Assessment & Plan:     78 y.o. female with the following -     Assessment & Plan      Problem List Items Addressed This Visit       Hyperlipidemia associated with type 2 diabetes mellitus (HCC)    Relevant Medications    losartan (COZAAR) 25 MG Tab    Type 2 diabetes mellitus with microalbuminuria, without long-term current use of insulin (HCC)     Chronic, stable. Continue healthy lifestyle recommendations. " Will check A1c today; will request retinal screen from ophthalmology.         Relevant Orders    POCT  A1C    HEMOGLOBIN A1C    Comp Metabolic Panel    Lipid Profile    Hypertension associated with diabetes (HCC)    Relevant Medications    losartan (COZAAR) 25 MG Tab    Presence of pessary     Seeing gyn for this. Reports uterine bleeding with this in place. Using premarin for symptom management with good relief.          Other Visit Diagnoses       Postmenopausal        Relevant Orders    VITAMIN D,25 HYDROXY (DEFICIENCY)          HCC Gap Form    Diagnosis to address: E11.69, E78.5 - Hyperlipidemia associated with type 2 diabetes mellitus (HCC)  Assessment and plan: Chronic, stable. Continue with current defined treatment plan: continue rosuvastatin 40 mg daily. Follow-up at least annually.  Diagnosis: E11.29, R80.9 - Type 2 diabetes mellitus with microalbuminuria, without long-term current use of insulin (HCC)  Assessment and plan: Chronic, stable. Continue with current defined treatment plan: diet controlled, continue healthy lifestyle recommendations. Follow-up at least annually.  Diagnosis: E11.59, I15.2 - Hypertension associated with diabetes (HCC)  Assessment and plan: Chronic, stable. Continue with current defined treatment plan: continue losartan 25 mg daily. Follow-up at least annually.  Last edited 04/01/24 12:38 PDT by Peyton Wynn, [email or web address].         Patient was educated in proper administration of medication(s) ordered today including safety, possible SE, risks, benefits, rationale and alternatives to therapy.   Supportive care, differential diagnoses, and indications for immediate follow-up discussed with patient.    Pathogenesis of diagnosis discussed including typical length and natural progression.    Instructed to return to clinic or nearest emergency department for any change in condition, further concerns, or worsening of symptoms.  Patient states understanding of the plan of care and  discharge instructions.    Return in about 6 months (around 10/1/2024) for Annual Wellness.    Please note that this dictation was created using voice recognition software. I have made every reasonable attempt to correct obvious errors, but I expect that there are errors of grammar and possibly content that I did not discover before finalizing the note.

## 2024-04-01 NOTE — ASSESSMENT & PLAN NOTE
Seeing gyn for this. Reports uterine bleeding with this in place. Using premarin for symptom management with good relief.

## 2024-04-02 ENCOUNTER — PHARMACY VISIT (OUTPATIENT)
Dept: PHARMACY | Facility: MEDICAL CENTER | Age: 79
End: 2024-04-02
Payer: COMMERCIAL

## 2024-04-02 ENCOUNTER — OFFICE VISIT (OUTPATIENT)
Dept: URGENT CARE | Facility: PHYSICIAN GROUP | Age: 79
End: 2024-04-02
Payer: MEDICARE

## 2024-04-02 VITALS
DIASTOLIC BLOOD PRESSURE: 68 MMHG | BODY MASS INDEX: 23.09 KG/M2 | RESPIRATION RATE: 18 BRPM | TEMPERATURE: 98.1 F | SYSTOLIC BLOOD PRESSURE: 116 MMHG | OXYGEN SATURATION: 92 % | HEIGHT: 65 IN | WEIGHT: 138.6 LBS | HEART RATE: 112 BPM

## 2024-04-02 DIAGNOSIS — J01.40 ACUTE PANSINUSITIS, RECURRENCE NOT SPECIFIED: Primary | ICD-10-CM

## 2024-04-02 DIAGNOSIS — R05.9 COUGH, UNSPECIFIED TYPE: ICD-10-CM

## 2024-04-02 DIAGNOSIS — R09.82 POST-NASAL DRIP: ICD-10-CM

## 2024-04-02 PROCEDURE — 3078F DIAST BP <80 MM HG: CPT | Performed by: PHYSICIAN ASSISTANT

## 2024-04-02 PROCEDURE — 99213 OFFICE O/P EST LOW 20 MIN: CPT | Performed by: PHYSICIAN ASSISTANT

## 2024-04-02 PROCEDURE — RXMED WILLOW AMBULATORY MEDICATION CHARGE: Performed by: PHYSICIAN ASSISTANT

## 2024-04-02 PROCEDURE — RXOTC WILLOW AMBULATORY OTC CHARGE

## 2024-04-02 PROCEDURE — 3074F SYST BP LT 130 MM HG: CPT | Performed by: PHYSICIAN ASSISTANT

## 2024-04-02 RX ORDER — AMOXICILLIN AND CLAVULANATE POTASSIUM 875; 125 MG/1; MG/1
1 TABLET, FILM COATED ORAL 2 TIMES DAILY
Qty: 14 TABLET | Refills: 0 | Status: SHIPPED | OUTPATIENT
Start: 2024-04-02 | End: 2024-04-09

## 2024-04-02 ASSESSMENT — ENCOUNTER SYMPTOMS
COUGH: 1
SHORTNESS OF BREATH: 0
RHINORRHEA: 0
SINUS PAIN: 1
STRIDOR: 0
SPUTUM PRODUCTION: 1
HEADACHES: 1
SORE THROAT: 0
FEVER: 0
WHEEZING: 0

## 2024-04-02 ASSESSMENT — FIBROSIS 4 INDEX: FIB4 SCORE: 0.81

## 2024-04-02 ASSESSMENT — COPD QUESTIONNAIRES: COPD: 0

## 2024-04-02 NOTE — PROGRESS NOTES
Subjective     Alejandra Billy is a 78 y.o. female who presents with Cough (Ore throat,x1 week)    PMH:  has a past medical history of Hypercholesteremia (11/6/2014) and Type II or unspecified type diabetes mellitus without mention of complication, not stated as uncontrolled (11/6/2014).  MEDS:   Current Outpatient Medications:     losartan (COZAAR) 25 MG Tab, Take 0.5 Tablet by mouth every day., Disp: 100 Tablet, Rfl: 1    estradiol (ESTRACE) 0.1 MG/GM vaginal cream, Insert one gram vaginally 2 to 3 times a week., Disp: 42.5 g, Rfl: 11    rosuvastatin (CRESTOR) 40 MG tablet, TAKE 1 TABLET BY MOUTH EVERY DAY, Disp: 100 Tablet, Rfl: 3    estrogens, conjugated (PREMARIN) 0.625 MG/GM Cream, Apply 1 gram vaginally at bedtime 2 times a week, Disp: 30 g, Rfl: 11    vitamin D3 (CHOLECALCIFEROL) 1000 Unit (25 mcg) Tab, Take 1,000 Units by mouth every day., Disp: , Rfl:     Calcium Carb-Cholecalciferol (CALCIUM 600 + D PO), Take  by mouth., Disp: , Rfl:     multivitamin Tab, Take 1 Tablet by mouth every day., Disp: , Rfl:     Coenzyme Q10 (COQ-10) 50 MG Cap, Take 50 mg by mouth every day., Disp: 90 Capsule, Rfl: 3  ALLERGIES: No Known Allergies  SURGHX:   Past Surgical History:   Procedure Laterality Date    APPENDECTOMY      DENTAL EXTRACTION(S)      wisdom teeth    TONSILLECTOMY       SOCHX:  reports that she has never smoked. She has never used smokeless tobacco. She reports that she does not drink alcohol and does not use drugs.  FH: Reviewed with patient, not pertinent to this visit.           Patient presents with sinus pain and pressure, nasal congestion, postnasal drip and a cough for 9 to 10 days.  Patient states she has had a little bit of a sore throat for the last week.  Patient has taken some over-the-counter cough cold medications with little change in her symptoms.      Cough  This is a new problem. Episode onset: 9 days. The problem has been unchanged. The problem occurs every few minutes. The cough is  "Productive of sputum. Associated symptoms include ear congestion, headaches, nasal congestion and postnasal drip. Pertinent negatives include no chest pain, ear pain, fever, rhinorrhea, sore throat, shortness of breath or wheezing. Exacerbated by: cough is worse at night, wakes her from sleep. She has tried body position changes, OTC cough suppressant and rest for the symptoms. The treatment provided mild relief. Her past medical history is significant for bronchitis. There is no history of asthma, COPD or pneumonia.       Review of Systems   Constitutional:  Negative for fever.   HENT:  Positive for congestion, postnasal drip and sinus pain. Negative for ear pain, rhinorrhea and sore throat.    Respiratory:  Positive for cough and sputum production. Negative for shortness of breath, wheezing and stridor.    Cardiovascular:  Negative for chest pain and leg swelling.   Neurological:  Positive for headaches.   All other systems reviewed and are negative.             Objective     /68 (BP Location: Right arm, Patient Position: Sitting, BP Cuff Size: Adult)   Pulse (!) 112   Temp 36.7 °C (98.1 °F) (Temporal)   Resp 18   Ht 1.651 m (5' 5\")   Wt 62.9 kg (138 lb 9.6 oz)   LMP  (LMP Unknown)   SpO2 92%   BMI 23.06 kg/m²      Physical Exam  Vitals and nursing note reviewed.   Constitutional:       General: She is not in acute distress.     Appearance: Normal appearance. She is well-developed and normal weight.   HENT:      Head: Normocephalic and atraumatic.      Right Ear: Tympanic membrane normal.      Left Ear: Tympanic membrane normal.      Nose: Mucosal edema, congestion and rhinorrhea present.      Right Turbinates: Enlarged.      Left Turbinates: Enlarged.      Right Sinus: Maxillary sinus tenderness present.      Left Sinus: Maxillary sinus tenderness present.      Mouth/Throat:      Lips: Pink.      Mouth: Mucous membranes are moist.      Pharynx: Uvula midline. Posterior oropharyngeal erythema " present. No oropharyngeal exudate.   Eyes:      Extraocular Movements: Extraocular movements intact.      Conjunctiva/sclera: Conjunctivae normal.      Pupils: Pupils are equal, round, and reactive to light.   Cardiovascular:      Rate and Rhythm: Normal rate and regular rhythm.      Pulses: Normal pulses.      Heart sounds: Normal heart sounds.   Pulmonary:      Effort: Pulmonary effort is normal. No respiratory distress.      Breath sounds: Normal breath sounds. No decreased breath sounds, wheezing, rhonchi or rales.   Abdominal:      Palpations: Abdomen is soft.   Musculoskeletal:         General: Normal range of motion.      Cervical back: Normal range of motion and neck supple.   Lymphadenopathy:      Cervical: No cervical adenopathy.   Skin:     General: Skin is warm and dry.      Capillary Refill: Capillary refill takes less than 2 seconds.   Neurological:      General: No focal deficit present.      Mental Status: She is alert and oriented to person, place, and time.      Gait: Gait normal.   Psychiatric:         Mood and Affect: Mood normal.                             Assessment & Plan        1. Acute pansinusitis, recurrence not specified     - amoxicillin-clavulanate (AUGMENTIN) 875-125 MG Tab; Take 1 Tablet by mouth 2 times a day for 7 days.  Dispense: 14 Tablet; Refill: 0    2. Cough, unspecified type     - amoxicillin-clavulanate (AUGMENTIN) 875-125 MG Tab; Take 1 Tablet by mouth 2 times a day for 7 days.  Dispense: 14 Tablet; Refill: 0    3. Post-nasal drip     - amoxicillin-clavulanate (AUGMENTIN) 875-125 MG Tab; Take 1 Tablet by mouth 2 times a day for 7 days.  Dispense: 14 Tablet; Refill: 0            Patient HPI physical exam consistent with acute sinusitis causing postnasal drip and a cough.  Patient has taking over-the-counter cough cold medications with some relief of the cough but no resolution of the sinus congestion.  I will treat the sinusitis with Augmentin twice daily x 7 days.  Patient  was offered prescription cough syrup, Tessalon Perles, politely declined.    On exam patient's O2 sat is 92%, patient's baseline O2 sat is 94% on room air I feel this slight reduction is not worrisome for pneumonia.  I did discuss this with patient, offered chest x-ray which she politely declined.  Patient will begin antibiotics for sinus infection today.    PT can begin or continue OTC medications, increase fluids and rest until symptoms improve.     Differential diagnosis, supportive care, and indications for immediate follow-up discussed with patient.  Instructed to return to clinic or nearest emergency department for any change in condition, further concerns, or worsening of symptoms.    I personally reviewed prior external notes and test results pertinent to today's visit.  I have independently reviewed and interpreted all diagnostics ordered during this urgent care visit.    PT should follow up with PCP in 1-2 days for re-evaluation if symptoms have not improved.      Discussed red flags and reasons to return to UC or ED.      Pt and/or family verbalized understanding of diagnosis and follow up instructions and was offered informational handout on diagnosis.  PT discharged.     Please note that this dictation was created using voice recognition software. I have made every reasonable attempt to correct obvious errors, but I expect that there may be errors of grammar and possibly content that I did not discover before finalizing the note.

## 2024-05-20 ENCOUNTER — HOSPITAL ENCOUNTER (OUTPATIENT)
Dept: RADIOLOGY | Facility: MEDICAL CENTER | Age: 79
End: 2024-05-20
Payer: MEDICARE

## 2024-05-20 DIAGNOSIS — Z12.31 VISIT FOR SCREENING MAMMOGRAM: ICD-10-CM

## 2024-05-21 ENCOUNTER — PHARMACY VISIT (OUTPATIENT)
Dept: PHARMACY | Facility: MEDICAL CENTER | Age: 79
End: 2024-05-21
Payer: COMMERCIAL

## 2024-05-21 PROCEDURE — RXMED WILLOW AMBULATORY MEDICATION CHARGE

## 2024-06-05 PROCEDURE — RXMED WILLOW AMBULATORY MEDICATION CHARGE: Performed by: OBSTETRICS & GYNECOLOGY

## 2024-06-10 ENCOUNTER — PHARMACY VISIT (OUTPATIENT)
Dept: PHARMACY | Facility: MEDICAL CENTER | Age: 79
End: 2024-06-10
Payer: COMMERCIAL

## 2024-06-17 ENCOUNTER — TELEPHONE (OUTPATIENT)
Dept: MEDICAL GROUP | Facility: PHYSICIAN GROUP | Age: 79
End: 2024-06-17
Payer: MEDICARE

## 2024-06-17 NOTE — TELEPHONE ENCOUNTER
VOICEMAIL  1. Caller Name: Alejandra Billy                        Call Back Number: [phone] (home)       2. Message: pt wants to have a labs done prior to her appt and she is wondering what her A1c results.  3. Patient approves office to leave a detailed voicemail/MyChart message: yes

## 2024-06-25 ENCOUNTER — PHARMACY VISIT (OUTPATIENT)
Dept: PHARMACY | Facility: MEDICAL CENTER | Age: 79
End: 2024-06-25
Payer: COMMERCIAL

## 2024-06-25 PROCEDURE — RXMED WILLOW AMBULATORY MEDICATION CHARGE: Performed by: OPHTHALMOLOGY

## 2024-06-25 RX ORDER — CIPROFLOXACIN HYDROCHLORIDE 3.5 MG/ML
SOLUTION/ DROPS TOPICAL
Qty: 5 ML | Refills: 0 | OUTPATIENT
Start: 2024-06-25

## 2024-06-25 RX ORDER — PREDNISOLONE ACETATE 10 MG/ML
SUSPENSION/ DROPS OPHTHALMIC
Qty: 10 ML | Refills: 1 | OUTPATIENT
Start: 2024-06-25

## 2024-07-29 DIAGNOSIS — M79.642 LEFT HAND PAIN: ICD-10-CM

## 2024-08-13 PROCEDURE — RXMED WILLOW AMBULATORY MEDICATION CHARGE: Performed by: OBSTETRICS & GYNECOLOGY

## 2024-08-16 ENCOUNTER — PHARMACY VISIT (OUTPATIENT)
Dept: PHARMACY | Facility: MEDICAL CENTER | Age: 79
End: 2024-08-16
Payer: COMMERCIAL

## 2024-08-21 PROBLEM — M18.12 PRIMARY OSTEOARTHRITIS OF FIRST CARPOMETACARPAL JOINT OF LEFT HAND: Status: ACTIVE | Noted: 2024-08-21

## 2024-08-21 PROCEDURE — RXMED WILLOW AMBULATORY MEDICATION CHARGE: Performed by: ORTHOPAEDIC SURGERY

## 2024-08-23 PROCEDURE — RXMED WILLOW AMBULATORY MEDICATION CHARGE

## 2024-08-24 ENCOUNTER — PHARMACY VISIT (OUTPATIENT)
Dept: PHARMACY | Facility: MEDICAL CENTER | Age: 79
End: 2024-08-24
Payer: COMMERCIAL

## 2024-09-30 ENCOUNTER — HOSPITAL ENCOUNTER (OUTPATIENT)
Dept: LAB | Facility: MEDICAL CENTER | Age: 79
End: 2024-09-30
Payer: MEDICARE

## 2024-09-30 DIAGNOSIS — Z78.0 POSTMENOPAUSAL: ICD-10-CM

## 2024-09-30 DIAGNOSIS — R80.9 TYPE 2 DIABETES MELLITUS WITH MICROALBUMINURIA, WITHOUT LONG-TERM CURRENT USE OF INSULIN (HCC): ICD-10-CM

## 2024-09-30 DIAGNOSIS — E11.29 TYPE 2 DIABETES MELLITUS WITH MICROALBUMINURIA, WITHOUT LONG-TERM CURRENT USE OF INSULIN (HCC): ICD-10-CM

## 2024-09-30 LAB
25(OH)D3 SERPL-MCNC: 37 NG/ML (ref 30–100)
ALBUMIN SERPL BCP-MCNC: 4.2 G/DL (ref 3.2–4.9)
ALBUMIN/GLOB SERPL: 1.7 G/DL
ALP SERPL-CCNC: 75 U/L (ref 30–99)
ALT SERPL-CCNC: 19 U/L (ref 2–50)
ANION GAP SERPL CALC-SCNC: 10 MMOL/L (ref 7–16)
AST SERPL-CCNC: 20 U/L (ref 12–45)
BILIRUB SERPL-MCNC: 0.4 MG/DL (ref 0.1–1.5)
BUN SERPL-MCNC: 13 MG/DL (ref 8–22)
CALCIUM ALBUM COR SERPL-MCNC: 9.1 MG/DL (ref 8.5–10.5)
CALCIUM SERPL-MCNC: 9.3 MG/DL (ref 8.5–10.5)
CHLORIDE SERPL-SCNC: 102 MMOL/L (ref 96–112)
CHOLEST SERPL-MCNC: 154 MG/DL (ref 100–199)
CO2 SERPL-SCNC: 25 MMOL/L (ref 20–33)
CREAT SERPL-MCNC: 0.66 MG/DL (ref 0.5–1.4)
EST. AVERAGE GLUCOSE BLD GHB EST-MCNC: 143 MG/DL
GFR SERPLBLD CREATININE-BSD FMLA CKD-EPI: 88 ML/MIN/1.73 M 2
GLOBULIN SER CALC-MCNC: 2.5 G/DL (ref 1.9–3.5)
GLUCOSE SERPL-MCNC: 101 MG/DL (ref 65–99)
HBA1C MFR BLD: 6.6 % (ref 4–5.6)
HDLC SERPL-MCNC: 71 MG/DL
LDLC SERPL CALC-MCNC: 69 MG/DL
POTASSIUM SERPL-SCNC: 4.2 MMOL/L (ref 3.6–5.5)
PROT SERPL-MCNC: 6.7 G/DL (ref 6–8.2)
SODIUM SERPL-SCNC: 137 MMOL/L (ref 135–145)
TRIGL SERPL-MCNC: 71 MG/DL (ref 0–149)

## 2024-09-30 PROCEDURE — 83036 HEMOGLOBIN GLYCOSYLATED A1C: CPT

## 2024-09-30 PROCEDURE — 80061 LIPID PANEL: CPT

## 2024-09-30 PROCEDURE — 36415 COLL VENOUS BLD VENIPUNCTURE: CPT

## 2024-09-30 PROCEDURE — 80053 COMPREHEN METABOLIC PANEL: CPT

## 2024-09-30 PROCEDURE — 82306 VITAMIN D 25 HYDROXY: CPT

## 2024-10-04 SDOH — HEALTH STABILITY: PHYSICAL HEALTH: ON AVERAGE, HOW MANY DAYS PER WEEK DO YOU ENGAGE IN MODERATE TO STRENUOUS EXERCISE (LIKE A BRISK WALK)?: 3 DAYS

## 2024-10-04 SDOH — ECONOMIC STABILITY: FOOD INSECURITY: WITHIN THE PAST 12 MONTHS, THE FOOD YOU BOUGHT JUST DIDN'T LAST AND YOU DIDN'T HAVE MONEY TO GET MORE.: NEVER TRUE

## 2024-10-04 SDOH — ECONOMIC STABILITY: INCOME INSECURITY: IN THE LAST 12 MONTHS, WAS THERE A TIME WHEN YOU WERE NOT ABLE TO PAY THE MORTGAGE OR RENT ON TIME?: NO

## 2024-10-04 SDOH — ECONOMIC STABILITY: FOOD INSECURITY: WITHIN THE PAST 12 MONTHS, YOU WORRIED THAT YOUR FOOD WOULD RUN OUT BEFORE YOU GOT MONEY TO BUY MORE.: NEVER TRUE

## 2024-10-04 SDOH — ECONOMIC STABILITY: INCOME INSECURITY: HOW HARD IS IT FOR YOU TO PAY FOR THE VERY BASICS LIKE FOOD, HOUSING, MEDICAL CARE, AND HEATING?: NOT HARD AT ALL

## 2024-10-04 SDOH — HEALTH STABILITY: PHYSICAL HEALTH: ON AVERAGE, HOW MANY MINUTES DO YOU ENGAGE IN EXERCISE AT THIS LEVEL?: 30 MIN

## 2024-10-04 ASSESSMENT — SOCIAL DETERMINANTS OF HEALTH (SDOH)
IN THE PAST 12 MONTHS, HAS THE ELECTRIC, GAS, OIL, OR WATER COMPANY THREATENED TO SHUT OFF SERVICE IN YOUR HOME?: NO
HOW OFTEN DO YOU HAVE SIX OR MORE DRINKS ON ONE OCCASION: NEVER
HOW OFTEN DO YOU ATTEND CHURCH OR RELIGIOUS SERVICES?: NEVER
HOW OFTEN DO YOU HAVE A DRINK CONTAINING ALCOHOL: NEVER
IN A TYPICAL WEEK, HOW MANY TIMES DO YOU TALK ON THE PHONE WITH FAMILY, FRIENDS, OR NEIGHBORS?: THREE TIMES A WEEK
IN A TYPICAL WEEK, HOW MANY TIMES DO YOU TALK ON THE PHONE WITH FAMILY, FRIENDS, OR NEIGHBORS?: THREE TIMES A WEEK
HOW OFTEN DO YOU GET TOGETHER WITH FRIENDS OR RELATIVES?: MORE THAN THREE TIMES A WEEK
HOW HARD IS IT FOR YOU TO PAY FOR THE VERY BASICS LIKE FOOD, HOUSING, MEDICAL CARE, AND HEATING?: NOT HARD AT ALL
HOW MANY DRINKS CONTAINING ALCOHOL DO YOU HAVE ON A TYPICAL DAY WHEN YOU ARE DRINKING: PATIENT DOES NOT DRINK
HOW OFTEN DO YOU ATTENT MEETINGS OF THE CLUB OR ORGANIZATION YOU BELONG TO?: 1 TO 4 TIMES PER YEAR
DO YOU BELONG TO ANY CLUBS OR ORGANIZATIONS SUCH AS CHURCH GROUPS UNIONS, FRATERNAL OR ATHLETIC GROUPS, OR SCHOOL GROUPS?: YES
HOW OFTEN DO YOU GET TOGETHER WITH FRIENDS OR RELATIVES?: MORE THAN THREE TIMES A WEEK
WITHIN THE PAST 12 MONTHS, YOU WORRIED THAT YOUR FOOD WOULD RUN OUT BEFORE YOU GOT THE MONEY TO BUY MORE: NEVER TRUE
HOW OFTEN DO YOU ATTENT MEETINGS OF THE CLUB OR ORGANIZATION YOU BELONG TO?: 1 TO 4 TIMES PER YEAR
HOW OFTEN DO YOU ATTEND CHURCH OR RELIGIOUS SERVICES?: NEVER
DO YOU BELONG TO ANY CLUBS OR ORGANIZATIONS SUCH AS CHURCH GROUPS UNIONS, FRATERNAL OR ATHLETIC GROUPS, OR SCHOOL GROUPS?: YES

## 2024-10-04 ASSESSMENT — LIFESTYLE VARIABLES
HOW MANY STANDARD DRINKS CONTAINING ALCOHOL DO YOU HAVE ON A TYPICAL DAY: PATIENT DOES NOT DRINK
HOW OFTEN DO YOU HAVE A DRINK CONTAINING ALCOHOL: NEVER
SKIP TO QUESTIONS 9-10: 1
HOW OFTEN DO YOU HAVE SIX OR MORE DRINKS ON ONE OCCASION: NEVER
AUDIT-C TOTAL SCORE: 0

## 2024-10-07 ENCOUNTER — OFFICE VISIT (OUTPATIENT)
Dept: MEDICAL GROUP | Facility: PHYSICIAN GROUP | Age: 79
End: 2024-10-07
Payer: MEDICARE

## 2024-10-07 VITALS
OXYGEN SATURATION: 94 % | SYSTOLIC BLOOD PRESSURE: 110 MMHG | TEMPERATURE: 98 F | RESPIRATION RATE: 14 BRPM | BODY MASS INDEX: 22.33 KG/M2 | WEIGHT: 134 LBS | HEART RATE: 64 BPM | HEIGHT: 65 IN | DIASTOLIC BLOOD PRESSURE: 60 MMHG

## 2024-10-07 DIAGNOSIS — R80.9 TYPE 2 DIABETES MELLITUS WITH MICROALBUMINURIA, WITHOUT LONG-TERM CURRENT USE OF INSULIN (HCC): ICD-10-CM

## 2024-10-07 DIAGNOSIS — E11.29 TYPE 2 DIABETES MELLITUS WITH MICROALBUMINURIA, WITHOUT LONG-TERM CURRENT USE OF INSULIN (HCC): ICD-10-CM

## 2024-10-07 DIAGNOSIS — E11.69 HYPERLIPIDEMIA ASSOCIATED WITH TYPE 2 DIABETES MELLITUS (HCC): ICD-10-CM

## 2024-10-07 DIAGNOSIS — E78.5 HYPERLIPIDEMIA ASSOCIATED WITH TYPE 2 DIABETES MELLITUS (HCC): ICD-10-CM

## 2024-10-07 DIAGNOSIS — E11.59 HYPERTENSION ASSOCIATED WITH DIABETES (HCC): ICD-10-CM

## 2024-10-07 DIAGNOSIS — Z78.0 MENOPAUSE: ICD-10-CM

## 2024-10-07 DIAGNOSIS — I15.2 HYPERTENSION ASSOCIATED WITH DIABETES (HCC): ICD-10-CM

## 2024-10-07 PROCEDURE — 3074F SYST BP LT 130 MM HG: CPT

## 2024-10-07 PROCEDURE — 3078F DIAST BP <80 MM HG: CPT

## 2024-10-07 PROCEDURE — G0439 PPPS, SUBSEQ VISIT: HCPCS

## 2024-10-07 ASSESSMENT — ACTIVITIES OF DAILY LIVING (ADL): BATHING_REQUIRES_ASSISTANCE: 0

## 2024-10-07 ASSESSMENT — FIBROSIS 4 INDEX: FIB4 SCORE: 0.97

## 2024-10-07 ASSESSMENT — PATIENT HEALTH QUESTIONNAIRE - PHQ9: CLINICAL INTERPRETATION OF PHQ2 SCORE: 0

## 2024-10-07 ASSESSMENT — ENCOUNTER SYMPTOMS: GENERAL WELL-BEING: EXCELLENT

## 2024-10-17 ENCOUNTER — PHARMACY VISIT (OUTPATIENT)
Dept: PHARMACY | Facility: MEDICAL CENTER | Age: 79
End: 2024-10-17
Payer: COMMERCIAL

## 2024-10-17 PROCEDURE — RXMED WILLOW AMBULATORY MEDICATION CHARGE: Performed by: INTERNAL MEDICINE

## 2024-10-17 RX ORDER — INFLUENZA A VIRUS A/VICTORIA/4897/2022 IVR-238 (H1N1) ANTIGEN (FORMALDEHYDE INACTIVATED), INFLUENZA A VIRUS A/CALIFORNIA/122/2022 SAN-022 (H3N2) ANTIGEN (FORMALDEHYDE INACTIVATED), AND INFLUENZA B VIRUS B/MICHIGAN/01/2021 ANTIGEN (FORMALDEHYDE INACTIVATED) 60; 60; 60 UG/.5ML; UG/.5ML; UG/.5ML
INJECTION, SUSPENSION INTRAMUSCULAR
Qty: 0.5 ML | Refills: 0 | OUTPATIENT
Start: 2024-10-17

## 2024-10-20 ENCOUNTER — PHARMACY VISIT (OUTPATIENT)
Dept: PHARMACY | Facility: MEDICAL CENTER | Age: 79
End: 2024-10-20
Payer: COMMERCIAL

## 2024-10-20 PROCEDURE — RXMED WILLOW AMBULATORY MEDICATION CHARGE: Performed by: OBSTETRICS & GYNECOLOGY

## 2024-11-04 PROCEDURE — RXMED WILLOW AMBULATORY MEDICATION CHARGE

## 2024-11-05 ENCOUNTER — PHARMACY VISIT (OUTPATIENT)
Dept: PHARMACY | Facility: MEDICAL CENTER | Age: 79
End: 2024-11-05
Payer: COMMERCIAL

## 2024-12-01 PROCEDURE — RXMED WILLOW AMBULATORY MEDICATION CHARGE

## 2024-12-03 ENCOUNTER — PHARMACY VISIT (OUTPATIENT)
Dept: PHARMACY | Facility: MEDICAL CENTER | Age: 79
End: 2024-12-03
Payer: COMMERCIAL

## 2025-01-09 PROCEDURE — RXMED WILLOW AMBULATORY MEDICATION CHARGE: Performed by: OBSTETRICS & GYNECOLOGY

## 2025-01-11 ENCOUNTER — PHARMACY VISIT (OUTPATIENT)
Dept: PHARMACY | Facility: MEDICAL CENTER | Age: 80
End: 2025-01-11
Payer: COMMERCIAL

## 2025-01-16 DIAGNOSIS — E78.00 HYPERCHOLESTEREMIA: ICD-10-CM

## 2025-01-16 DIAGNOSIS — E11.59 HYPERTENSION ASSOCIATED WITH DIABETES (HCC): ICD-10-CM

## 2025-01-16 DIAGNOSIS — I15.2 HYPERTENSION ASSOCIATED WITH DIABETES (HCC): ICD-10-CM

## 2025-01-16 NOTE — TELEPHONE ENCOUNTER
Received request via: Pharmacy    Was the patient seen in the last year in this department? Yes    Does the patient have an active prescription (recently filled or refills available) for medication(s) requested? No    Pharmacy Name: [facility]    Does the patient have [facility] and need 100-day supply? (This applies to ALL medications) Yes, quantity updated to 100 days

## 2025-01-17 RX ORDER — LOSARTAN POTASSIUM 25 MG/1
12.5 TABLET ORAL DAILY
Qty: 100 TABLET | Refills: 1 | Status: SHIPPED | OUTPATIENT
Start: 2025-01-17

## 2025-01-17 RX ORDER — ROSUVASTATIN CALCIUM 40 MG/1
40 TABLET, COATED ORAL DAILY
Qty: 100 TABLET | Refills: 3 | Status: SHIPPED | OUTPATIENT
Start: 2025-01-17

## 2025-01-17 NOTE — TELEPHONE ENCOUNTER
Received request via: Patient    Was the patient seen in the last year in this department? Yes    Does the patient have an active prescription (recently filled or refills available) for medication(s) requested? No    Pharmacy Name: [facility]    Does the patient have [facility] and need 100-day supply? (This applies to ALL medications) Yes, quantity updated to 100 days

## 2025-01-30 PROCEDURE — RXMED WILLOW AMBULATORY MEDICATION CHARGE: Performed by: DENTIST

## 2025-01-31 ENCOUNTER — PHARMACY VISIT (OUTPATIENT)
Dept: PHARMACY | Facility: MEDICAL CENTER | Age: 80
End: 2025-01-31
Payer: COMMERCIAL

## 2025-02-11 ENCOUNTER — PHARMACY VISIT (OUTPATIENT)
Dept: PHARMACY | Facility: MEDICAL CENTER | Age: 80
End: 2025-02-11
Payer: COMMERCIAL

## 2025-02-11 PROCEDURE — RXMED WILLOW AMBULATORY MEDICATION CHARGE: Performed by: DENTIST

## 2025-02-11 RX ORDER — CLINDAMYCIN HYDROCHLORIDE 300 MG/1
CAPSULE ORAL
Qty: 21 CAPSULE | Refills: 0 | OUTPATIENT
Start: 2025-02-11

## 2025-02-28 PROCEDURE — RXMED WILLOW AMBULATORY MEDICATION CHARGE

## 2025-03-11 ENCOUNTER — PHARMACY VISIT (OUTPATIENT)
Dept: PHARMACY | Facility: MEDICAL CENTER | Age: 80
End: 2025-03-11
Payer: COMMERCIAL

## 2025-04-28 RX ORDER — ESTRADIOL 0.1 MG/G
CREAM VAGINAL
Qty: 42.5 G | Refills: 11 | Status: SHIPPED | OUTPATIENT
Start: 2025-04-28

## 2025-04-29 PROCEDURE — RXMED WILLOW AMBULATORY MEDICATION CHARGE: Performed by: NURSE PRACTITIONER

## 2025-04-30 ENCOUNTER — PHARMACY VISIT (OUTPATIENT)
Dept: PHARMACY | Facility: MEDICAL CENTER | Age: 80
End: 2025-04-30
Payer: COMMERCIAL

## 2025-05-05 PROCEDURE — RXMED WILLOW AMBULATORY MEDICATION CHARGE

## 2025-05-08 ENCOUNTER — PHARMACY VISIT (OUTPATIENT)
Dept: PHARMACY | Facility: MEDICAL CENTER | Age: 80
End: 2025-05-08
Payer: COMMERCIAL

## 2025-05-15 DIAGNOSIS — Z13.29 THYROID DISORDER SCREEN: ICD-10-CM

## 2025-05-15 DIAGNOSIS — R80.9 TYPE 2 DIABETES MELLITUS WITH MICROALBUMINURIA, WITHOUT LONG-TERM CURRENT USE OF INSULIN (HCC): Primary | ICD-10-CM

## 2025-05-15 DIAGNOSIS — E55.9 VITAMIN D DEFICIENCY: ICD-10-CM

## 2025-05-15 DIAGNOSIS — N18.2 STAGE 2 CHRONIC KIDNEY DISEASE: ICD-10-CM

## 2025-05-15 DIAGNOSIS — E78.5 HYPERLIPIDEMIA ASSOCIATED WITH TYPE 2 DIABETES MELLITUS (HCC): ICD-10-CM

## 2025-05-15 DIAGNOSIS — E11.69 HYPERLIPIDEMIA ASSOCIATED WITH TYPE 2 DIABETES MELLITUS (HCC): ICD-10-CM

## 2025-05-15 DIAGNOSIS — E11.29 TYPE 2 DIABETES MELLITUS WITH MICROALBUMINURIA, WITHOUT LONG-TERM CURRENT USE OF INSULIN (HCC): Primary | ICD-10-CM

## 2025-06-04 ENCOUNTER — HOSPITAL ENCOUNTER (OUTPATIENT)
Dept: RADIOLOGY | Facility: MEDICAL CENTER | Age: 80
End: 2025-06-04
Payer: MEDICARE

## 2025-06-04 DIAGNOSIS — Z12.31 VISIT FOR SCREENING MAMMOGRAM: ICD-10-CM

## 2025-06-04 PROCEDURE — 77063 BREAST TOMOSYNTHESIS BI: CPT

## 2025-06-06 ENCOUNTER — RESULTS FOLLOW-UP (OUTPATIENT)
Dept: MEDICAL GROUP | Facility: PHYSICIAN GROUP | Age: 80
End: 2025-06-06

## 2025-06-09 PROCEDURE — RXMED WILLOW AMBULATORY MEDICATION CHARGE

## 2025-06-18 ENCOUNTER — PHARMACY VISIT (OUTPATIENT)
Dept: PHARMACY | Facility: MEDICAL CENTER | Age: 80
End: 2025-06-18
Payer: COMMERCIAL

## 2025-07-02 ENCOUNTER — TELEPHONE (OUTPATIENT)
Dept: HEALTH INFORMATION MANAGEMENT | Facility: OTHER | Age: 80
End: 2025-07-02
Payer: MEDICARE

## 2025-07-03 ENCOUNTER — PHARMACY VISIT (OUTPATIENT)
Dept: PHARMACY | Facility: MEDICAL CENTER | Age: 80
End: 2025-07-03
Payer: COMMERCIAL

## 2025-07-03 ENCOUNTER — OFFICE VISIT (OUTPATIENT)
Dept: URGENT CARE | Facility: PHYSICIAN GROUP | Age: 80
End: 2025-07-03
Payer: MEDICARE

## 2025-07-03 VITALS
DIASTOLIC BLOOD PRESSURE: 50 MMHG | TEMPERATURE: 98.3 F | WEIGHT: 137.3 LBS | SYSTOLIC BLOOD PRESSURE: 130 MMHG | OXYGEN SATURATION: 96 % | RESPIRATION RATE: 16 BRPM | HEART RATE: 85 BPM | HEIGHT: 65 IN | BODY MASS INDEX: 22.88 KG/M2

## 2025-07-03 DIAGNOSIS — N18.2 STAGE 2 CHRONIC KIDNEY DISEASE: ICD-10-CM

## 2025-07-03 DIAGNOSIS — E11.29 TYPE 2 DIABETES MELLITUS WITH MICROALBUMINURIA, WITHOUT LONG-TERM CURRENT USE OF INSULIN (HCC): ICD-10-CM

## 2025-07-03 DIAGNOSIS — R80.9 TYPE 2 DIABETES MELLITUS WITH MICROALBUMINURIA, WITHOUT LONG-TERM CURRENT USE OF INSULIN (HCC): ICD-10-CM

## 2025-07-03 DIAGNOSIS — L03.213 PERIORBITAL CELLULITIS OF LEFT EYE: Primary | ICD-10-CM

## 2025-07-03 PROCEDURE — 99214 OFFICE O/P EST MOD 30 MIN: CPT

## 2025-07-03 PROCEDURE — RXMED WILLOW AMBULATORY MEDICATION CHARGE

## 2025-07-03 PROCEDURE — 3078F DIAST BP <80 MM HG: CPT

## 2025-07-03 PROCEDURE — 1126F AMNT PAIN NOTED NONE PRSNT: CPT

## 2025-07-03 PROCEDURE — 3075F SYST BP GE 130 - 139MM HG: CPT

## 2025-07-03 RX ORDER — CEPHALEXIN 500 MG/1
500 CAPSULE ORAL 2 TIMES DAILY
Qty: 10 CAPSULE | Refills: 0 | Status: SHIPPED | OUTPATIENT
Start: 2025-07-03 | End: 2025-07-08

## 2025-07-03 ASSESSMENT — ENCOUNTER SYMPTOMS
EYE PAIN: 0
CHILLS: 0
EYE DISCHARGE: 0
BLURRED VISION: 0
EYE REDNESS: 0
FEVER: 0

## 2025-07-03 ASSESSMENT — FIBROSIS 4 INDEX: FIB4 SCORE: 0.98

## 2025-07-03 ASSESSMENT — PAIN SCALES - GENERAL: PAINLEVEL_OUTOF10: NO PAIN

## 2025-07-03 NOTE — PROGRESS NOTES
Chief Complaint   Patient presents with    Eye Problem     Possible stye left eye x 5 days        HISTORY OF PRESENT ILLNESS: Patient is a pleasant 80 y.o. female who presents to urgent care today patient comes in today with ongoing left eyelid upper and lower swelling into the cheek for the last 5 days.  She notes no drainage from her eye, no changes to her vision.  Area is painful and tender to touch.  She notes that warm compresses have made this worse.    Patient Active Problem List    Diagnosis Date Noted    Menopause 10/07/2024    Primary osteoarthritis of first carpometacarpal joint of left hand 2024    Memory deficit 2023    Vaginal erosion secondary to pessary use ([facility]) 2023    Stage 2 chronic kidney disease 2023    Left hand pain 2023    Osteopenia after menopause 2021    Overflow diarrhea 2021    Presence of pessary 2018    POP-Q stage 2 cystocele 10/23/2018    Hypertension associated with diabetes ([facility]) 2018    Eczema of both hands 2018    Hyperlipidemia associated with type 2 diabetes mellitus ([facility]) 2014    Type 2 diabetes mellitus with microalbuminuria, without long-term current use of insulin ([facility]) 2014       Allergies:Patient has no known allergies.    Current Medications and Prescriptions Ordered in Epic[1]    Past Medical History[2]    Social History[3]    Family Status   Relation Name Status    Mario Alberto Oropeza  at age 80's    Jami Mooney  at age 80's    Sis  Alive    Bro x2 Alive    PAunt None (Not Specified)    Neg Hx  (Not Specified)   No partnership data on file     Family History   Problem Relation Age of Onset    Breast Cancer Mother     Cancer Mother         breast    Hyperlipidemia Mother     Cancer Father         bladder    Genetic Disorder Father         Parkinson's    No Known Problems Sister     No Known Problems Brother     Breast Cancer Paternal Aunt     Cancer Paternal Aunt         breast    Diabetes  "Neg Hx     Stroke Neg Hx     Lung Disease Neg Hx     Heart Disease Neg Hx     Hypertension Neg Hx     Ovarian Cancer Neg Hx     Tubal Cancer Neg Hx     Peritoneal Cancer Neg Hx     Colorectal Cancer Neg Hx        Review of Systems   Constitutional:  Negative for chills, fever and malaise/fatigue.   Eyes:  Negative for blurred vision, pain, discharge and redness.       Exam:  /50 (BP Location: Right arm, Patient Position: Sitting, BP Cuff Size: Adult)   Pulse 85   Temp 36.8 °C (98.3 °F) (Temporal)   Resp 16   Ht 1.651 m (5' 5\")   Wt 62.3 kg (137 lb 4.8 oz)   SpO2 96%   Physical Exam  Vitals reviewed.   Constitutional:       General: She is not in acute distress.     Appearance: Normal appearance. She is normal weight.   HENT:      Head: Normocephalic.      Right Ear: Tympanic membrane, ear canal and external ear normal.      Left Ear: Tympanic membrane, ear canal and external ear normal.      Nose: Nose normal.      Mouth/Throat:      Mouth: Mucous membranes are moist.      Pharynx: Oropharynx is clear.   Eyes:      Extraocular Movements: Extraocular movements intact.      Pupils: Pupils are equal, round, and reactive to light.      Comments: Patient has notable swelling to her left lower eyelid that extends into her cheek, area is mildly red, warm to touch   Cardiovascular:      Rate and Rhythm: Normal rate and regular rhythm.      Pulses: Normal pulses.      Heart sounds: Normal heart sounds.   Pulmonary:      Effort: Pulmonary effort is normal. No respiratory distress.      Breath sounds: Normal breath sounds.   Musculoskeletal:         General: Normal range of motion.      Cervical back: Normal range of motion and neck supple. No rigidity.   Lymphadenopathy:      Cervical: No cervical adenopathy.   Skin:     General: Skin is warm and dry.   Neurological:      General: No focal deficit present.      Mental Status: She is alert.   Psychiatric:         Mood and Affect: Mood normal. "             Assessment/Plan:  1. Periorbital cellulitis of left eye  - cephALEXin (KEFLEX) 500 MG Cap; Take 1 Capsule by mouth 2 times a day for 5 days.  Dispense: 10 Capsule; Refill: 0    2. Stage 2 chronic kidney disease    3. Type 2 diabetes mellitus with microalbuminuria, without long-term current use of insulin ([facility])    Based on physical exam along with review of systems I did go ahead and place patient on a 5-day course of Keflex given the nature of the ongoing redness, swelling into her cheek, concerning for periorbital cellulitis.  Patient does have stage II chronic kidney disease, Keflex is appropriate for this.  Type 2 diabetes, advised to check her glucose more often while ill, drink plenty of fluids.  Patient currently denies any fevers.  Advised that should the redness, swelling, pain or any other symptoms persist or continue to get worse to please follow-up. Total time spent with the patient 35 minutes to include, review of chart, charting, assessment.    Supportive care, differential diagnoses, and indications for immediate follow-up discussed with patient.   Pathogenesis of diagnosis discussed including typical length and natural progression.   Instructed to return to clinic or nearest emergency department for any change in condition, further concerns, or worsening of symptoms.  Patient states understanding of the plan of care and discharge instructions.  Instructed to make an appointment, for follow up, with primary care provider.    Please note that this dictation was created using voice recognition software. I have made every reasonable attempt to correct obvious errors, but I expect that there are errors of grammar and possibly content that I did not discover before finalizing the note.      Keyla AUGUST        [1]   Current Outpatient Medications Ordered in Epic   Medication Sig Dispense Refill    cephALEXin (KEFLEX) 500 MG Cap Take 1 Capsule by mouth 2 times a day for 5 days. 10 Capsule  0    estradiol (ESTRACE) 0.1 MG/GM vaginal cream insert 1 gram intravaginally 2-3 times weekly 127.5 g 3    rosuvastatin (CRESTOR) 40 MG tablet TAKE 1 TABLET BY MOUTH EVERY  Tablet 3    losartan (COZAAR) 25 MG Tab Take 1/2 tablet by mouth every day. 100 Tablet 1    influenza vaccine High-Dose (FLUZONE HIGH-DOSE) 0.5 ML Suspension Prefilled Syringe injection Inject  into the shoulder, thigh, or buttocks. 0.5 mL 0    celecoxib (CELEBREX) 200 MG Cap Take 1 Capsule by mouth every day. 30 Capsule 1    ciprofloxacin (CILOXIN) 0.3 % Solution Instill 1 drop into right eye four times a day 5 mL 0    prednisoLONE acetate (PRED FORTE) 1 % Suspension Instill 1 drop into both eyes four times a day 10 mL 1    vitamin D3 (CHOLECALCIFEROL) 1000 Unit (25 mcg) Tab Take 1,000 Units by mouth every day.      Calcium Carb-Cholecalciferol (CALCIUM 600 + D PO) Take  by mouth.      multivitamin Tab Take 1 Tablet by mouth every day.      Coenzyme Q10 (COQ-10) 50 MG Cap Take 50 mg by mouth every day. 90 Capsule 3     No current [facility]-ordered facility-administered medications on file.   [2]   Past Medical History:  Diagnosis Date    Hypercholesteremia 11/6/2014    Type II or unspecified type diabetes mellitus without mention of complication, not stated as uncontrolled 11/6/2014   [3]   Social History  Tobacco Use    Smoking status: Never    Smokeless tobacco: Never    Tobacco comments:     avoid all tobacco products   Vaping Use    Vaping status: Never Used   Substance Use Topics    Alcohol use: Never     Comment: little to none    Drug use: Never

## 2025-07-15 ENCOUNTER — PHARMACY VISIT (OUTPATIENT)
Dept: PHARMACY | Facility: MEDICAL CENTER | Age: 80
End: 2025-07-15
Payer: COMMERCIAL

## 2025-07-15 PROCEDURE — RXMED WILLOW AMBULATORY MEDICATION CHARGE: Performed by: OBSTETRICS & GYNECOLOGY

## 2025-07-31 ENCOUNTER — HOSPITAL ENCOUNTER (OUTPATIENT)
Dept: LAB | Facility: MEDICAL CENTER | Age: 80
End: 2025-07-31
Payer: MEDICARE

## 2025-07-31 DIAGNOSIS — Z13.29 THYROID DISORDER SCREEN: ICD-10-CM

## 2025-07-31 DIAGNOSIS — E78.5 HYPERLIPIDEMIA ASSOCIATED WITH TYPE 2 DIABETES MELLITUS (HCC): ICD-10-CM

## 2025-07-31 DIAGNOSIS — E55.9 VITAMIN D DEFICIENCY: ICD-10-CM

## 2025-07-31 DIAGNOSIS — R80.9 TYPE 2 DIABETES MELLITUS WITH MICROALBUMINURIA, WITHOUT LONG-TERM CURRENT USE OF INSULIN (HCC): ICD-10-CM

## 2025-07-31 DIAGNOSIS — E11.29 TYPE 2 DIABETES MELLITUS WITH MICROALBUMINURIA, WITHOUT LONG-TERM CURRENT USE OF INSULIN (HCC): ICD-10-CM

## 2025-07-31 DIAGNOSIS — E11.69 HYPERLIPIDEMIA ASSOCIATED WITH TYPE 2 DIABETES MELLITUS (HCC): ICD-10-CM

## 2025-07-31 DIAGNOSIS — N18.2 STAGE 2 CHRONIC KIDNEY DISEASE: ICD-10-CM

## 2025-07-31 LAB
25(OH)D3 SERPL-MCNC: 39 NG/ML (ref 30–100)
ALBUMIN SERPL BCP-MCNC: 3.9 G/DL (ref 3.2–4.9)
ALBUMIN/GLOB SERPL: 1.7 G/DL
ALP SERPL-CCNC: 70 U/L (ref 30–99)
ALT SERPL-CCNC: 31 U/L (ref 2–50)
ANION GAP SERPL CALC-SCNC: 13 MMOL/L (ref 7–16)
AST SERPL-CCNC: 24 U/L (ref 12–45)
BILIRUB SERPL-MCNC: 0.4 MG/DL (ref 0.1–1.5)
BUN SERPL-MCNC: 10 MG/DL (ref 8–22)
CALCIUM ALBUM COR SERPL-MCNC: 9.1 MG/DL (ref 8.5–10.5)
CALCIUM SERPL-MCNC: 9 MG/DL (ref 8.5–10.5)
CHLORIDE SERPL-SCNC: 104 MMOL/L (ref 96–112)
CHOLEST SERPL-MCNC: 131 MG/DL (ref 100–199)
CO2 SERPL-SCNC: 23 MMOL/L (ref 20–33)
CREAT SERPL-MCNC: 0.75 MG/DL (ref 0.5–1.4)
CREAT UR-MCNC: 44.4 MG/DL
ERYTHROCYTE [DISTWIDTH] IN BLOOD BY AUTOMATED COUNT: 45.4 FL (ref 35.9–50)
EST. AVERAGE GLUCOSE BLD GHB EST-MCNC: 143 MG/DL
FASTING STATUS PATIENT QL REPORTED: NORMAL
GFR SERPLBLD CREATININE-BSD FMLA CKD-EPI: 80 ML/MIN/1.73 M 2
GLOBULIN SER CALC-MCNC: 2.3 G/DL (ref 1.9–3.5)
GLUCOSE SERPL-MCNC: 93 MG/DL (ref 65–99)
HBA1C MFR BLD: 6.6 % (ref 4–5.6)
HCT VFR BLD AUTO: 43.5 % (ref 37–47)
HDLC SERPL-MCNC: 62 MG/DL
HGB BLD-MCNC: 14.1 G/DL (ref 12–16)
LDLC SERPL CALC-MCNC: 57 MG/DL
MCH RBC QN AUTO: 28.8 PG (ref 27–33)
MCHC RBC AUTO-ENTMCNC: 32.4 G/DL (ref 32.2–35.5)
MCV RBC AUTO: 89 FL (ref 81.4–97.8)
MICROALBUMIN UR-MCNC: 1.7 MG/DL
MICROALBUMIN/CREAT UR: 38 MG/G (ref 0–30)
PLATELET # BLD AUTO: 396 K/UL (ref 164–446)
PMV BLD AUTO: 9.4 FL (ref 9–12.9)
POTASSIUM SERPL-SCNC: 4.4 MMOL/L (ref 3.6–5.5)
PROT SERPL-MCNC: 6.2 G/DL (ref 6–8.2)
RBC # BLD AUTO: 4.89 M/UL (ref 4.2–5.4)
SODIUM SERPL-SCNC: 140 MMOL/L (ref 135–145)
TRIGL SERPL-MCNC: 62 MG/DL (ref 0–149)
TSH SERPL-ACNC: 2.03 UIU/ML (ref 0.38–5.33)
WBC # BLD AUTO: 6.3 K/UL (ref 4.8–10.8)

## 2025-07-31 PROCEDURE — 36415 COLL VENOUS BLD VENIPUNCTURE: CPT

## 2025-07-31 PROCEDURE — 82306 VITAMIN D 25 HYDROXY: CPT

## 2025-07-31 PROCEDURE — 80061 LIPID PANEL: CPT

## 2025-07-31 PROCEDURE — 82570 ASSAY OF URINE CREATININE: CPT

## 2025-07-31 PROCEDURE — 85027 COMPLETE CBC AUTOMATED: CPT

## 2025-07-31 PROCEDURE — 84443 ASSAY THYROID STIM HORMONE: CPT

## 2025-07-31 PROCEDURE — 82043 UR ALBUMIN QUANTITATIVE: CPT

## 2025-07-31 PROCEDURE — 80053 COMPREHEN METABOLIC PANEL: CPT

## 2025-07-31 PROCEDURE — 83036 HEMOGLOBIN GLYCOSYLATED A1C: CPT

## 2025-08-07 ENCOUNTER — OFFICE VISIT (OUTPATIENT)
Dept: MEDICAL GROUP | Facility: PHYSICIAN GROUP | Age: 80
End: 2025-08-07
Payer: MEDICARE

## 2025-08-07 VITALS
OXYGEN SATURATION: 92 % | WEIGHT: 135 LBS | BODY MASS INDEX: 22.49 KG/M2 | SYSTOLIC BLOOD PRESSURE: 108 MMHG | DIASTOLIC BLOOD PRESSURE: 56 MMHG | HEART RATE: 80 BPM | RESPIRATION RATE: 16 BRPM | TEMPERATURE: 97.4 F | HEIGHT: 65 IN

## 2025-08-07 DIAGNOSIS — R80.9 MICROALBUMINURIA DUE TO TYPE 2 DIABETES MELLITUS (HCC): ICD-10-CM

## 2025-08-07 DIAGNOSIS — R80.9 TYPE 2 DIABETES MELLITUS WITH MICROALBUMINURIA, WITHOUT LONG-TERM CURRENT USE OF INSULIN (HCC): ICD-10-CM

## 2025-08-07 DIAGNOSIS — E11.29 TYPE 2 DIABETES MELLITUS WITH MICROALBUMINURIA, WITHOUT LONG-TERM CURRENT USE OF INSULIN (HCC): ICD-10-CM

## 2025-08-07 DIAGNOSIS — I15.2 HYPERTENSION ASSOCIATED WITH DIABETES (HCC): ICD-10-CM

## 2025-08-07 DIAGNOSIS — E78.5 HYPERLIPIDEMIA ASSOCIATED WITH TYPE 2 DIABETES MELLITUS (HCC): Primary | ICD-10-CM

## 2025-08-07 DIAGNOSIS — E11.59 HYPERTENSION ASSOCIATED WITH DIABETES (HCC): ICD-10-CM

## 2025-08-07 DIAGNOSIS — E11.69 HYPERLIPIDEMIA ASSOCIATED WITH TYPE 2 DIABETES MELLITUS (HCC): Primary | ICD-10-CM

## 2025-08-07 DIAGNOSIS — E11.29 MICROALBUMINURIA DUE TO TYPE 2 DIABETES MELLITUS (HCC): ICD-10-CM

## 2025-08-07 PROCEDURE — 3074F SYST BP LT 130 MM HG: CPT

## 2025-08-07 PROCEDURE — 3078F DIAST BP <80 MM HG: CPT

## 2025-08-07 PROCEDURE — 99214 OFFICE O/P EST MOD 30 MIN: CPT

## 2025-08-07 PROCEDURE — RXMED WILLOW AMBULATORY MEDICATION CHARGE

## 2025-08-07 ASSESSMENT — PATIENT HEALTH QUESTIONNAIRE - PHQ9: CLINICAL INTERPRETATION OF PHQ2 SCORE: 0

## 2025-08-07 ASSESSMENT — FIBROSIS 4 INDEX: FIB4 SCORE: 0.88

## 2025-08-11 ENCOUNTER — PHARMACY VISIT (OUTPATIENT)
Dept: PHARMACY | Facility: MEDICAL CENTER | Age: 80
End: 2025-08-11
Payer: COMMERCIAL

## 2025-08-15 ENCOUNTER — SPECIALTY PHARMACY (OUTPATIENT)
Dept: CARDIOLOGY | Facility: MEDICAL CENTER | Age: 80
End: 2025-08-15
Payer: MEDICARE

## 2025-08-15 PROCEDURE — RXMED WILLOW AMBULATORY MEDICATION CHARGE

## 2025-08-19 ENCOUNTER — PHARMACY VISIT (OUTPATIENT)
Dept: PHARMACY | Facility: MEDICAL CENTER | Age: 80
End: 2025-08-19
Payer: COMMERCIAL